# Patient Record
Sex: FEMALE | Race: WHITE | NOT HISPANIC OR LATINO | Employment: PART TIME | URBAN - METROPOLITAN AREA
[De-identification: names, ages, dates, MRNs, and addresses within clinical notes are randomized per-mention and may not be internally consistent; named-entity substitution may affect disease eponyms.]

---
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
## 2019-10-03 ENCOUNTER — HEALTH MAINTENANCE LETTER (OUTPATIENT)
Age: 19
End: 2019-10-03

## 2019-12-05 ENCOUNTER — HOSPITAL ENCOUNTER (EMERGENCY)
Facility: CLINIC | Age: 19
Discharge: PSYCHIATRIC OR SUBSTANCE USE FACILITY | End: 2019-12-05
Attending: EMERGENCY MEDICINE | Admitting: EMERGENCY MEDICINE
Payer: COMMERCIAL

## 2019-12-05 ENCOUNTER — HOSPITAL ENCOUNTER (INPATIENT)
Facility: CLINIC | Age: 19
LOS: 1 days | Discharge: HOME | DRG: 882 | End: 2019-12-06
Attending: PSYCHIATRY & NEUROLOGY | Admitting: PSYCHIATRY & NEUROLOGY
Payer: COMMERCIAL

## 2019-12-05 VITALS
HEART RATE: 73 BPM | DIASTOLIC BLOOD PRESSURE: 78 MMHG | TEMPERATURE: 98.5 F | RESPIRATION RATE: 16 BRPM | OXYGEN SATURATION: 99 % | SYSTOLIC BLOOD PRESSURE: 115 MMHG | WEIGHT: 136.91 LBS | BODY MASS INDEX: 20.36 KG/M2

## 2019-12-05 DIAGNOSIS — F32.A DEPRESSION, UNSPECIFIED DEPRESSION TYPE: ICD-10-CM

## 2019-12-05 DIAGNOSIS — T14.91XA SUICIDAL BEHAVIOR WITH ATTEMPTED SELF-INJURY (H): Primary | ICD-10-CM

## 2019-12-05 DIAGNOSIS — T50.904A DRUG OVERDOSE, UNDETERMINED INTENT, INITIAL ENCOUNTER: ICD-10-CM

## 2019-12-05 PROBLEM — R45.89 SUICIDAL BEHAVIOR: Status: ACTIVE | Noted: 2019-12-05

## 2019-12-05 LAB
ALBUMIN SERPL-MCNC: 3.8 G/DL (ref 3.4–5)
ALBUMIN UR-MCNC: NEGATIVE MG/DL
ALP SERPL-CCNC: 57 U/L (ref 40–150)
ALT SERPL W P-5'-P-CCNC: 20 U/L (ref 0–50)
AMPHETAMINES UR QL SCN: NEGATIVE
ANION GAP SERPL CALCULATED.3IONS-SCNC: 8 MMOL/L (ref 3–14)
APAP SERPL-MCNC: <2 MG/L (ref 10–20)
APAP SERPL-MCNC: <2 MG/L (ref 10–20)
APPEARANCE UR: CLEAR
AST SERPL W P-5'-P-CCNC: 20 U/L (ref 0–45)
B-HCG FREE SERPL-ACNC: <5 IU/L
BACTERIA #/AREA URNS HPF: ABNORMAL /HPF
BARBITURATES UR QL: NEGATIVE
BASOPHILS # BLD AUTO: 0 10E9/L (ref 0–0.2)
BASOPHILS NFR BLD AUTO: 0.5 %
BENZODIAZ UR QL: NEGATIVE
BILIRUB SERPL-MCNC: 0.9 MG/DL (ref 0.2–1.3)
BILIRUB UR QL STRIP: NEGATIVE
BUN SERPL-MCNC: 16 MG/DL (ref 7–30)
CALCIUM SERPL-MCNC: 9.1 MG/DL (ref 8.5–10.1)
CANNABINOIDS UR QL SCN: POSITIVE
CHLORIDE SERPL-SCNC: 108 MMOL/L (ref 94–109)
CO2 SERPL-SCNC: 23 MMOL/L (ref 20–32)
COCAINE UR QL: POSITIVE
COLOR UR AUTO: ABNORMAL
CREAT SERPL-MCNC: 0.9 MG/DL (ref 0.52–1.04)
DIFFERENTIAL METHOD BLD: NORMAL
EOSINOPHIL # BLD AUTO: 0.2 10E9/L (ref 0–0.7)
EOSINOPHIL NFR BLD AUTO: 4.7 %
ERYTHROCYTE [DISTWIDTH] IN BLOOD BY AUTOMATED COUNT: 12.5 % (ref 10–15)
ETHANOL SERPL-MCNC: <0.01 G/DL
GFR SERPL CREATININE-BSD FRML MDRD: >90 ML/MIN/{1.73_M2}
GLUCOSE SERPL-MCNC: 84 MG/DL (ref 70–99)
GLUCOSE UR STRIP-MCNC: NEGATIVE MG/DL
HCT VFR BLD AUTO: 41 % (ref 35–47)
HGB BLD-MCNC: 13.5 G/DL (ref 11.7–15.7)
HGB UR QL STRIP: ABNORMAL
IMM GRANULOCYTES # BLD: 0 10E9/L (ref 0–0.4)
IMM GRANULOCYTES NFR BLD: 0.2 %
INTERPRETATION ECG - MUSE: NORMAL
KETONES UR STRIP-MCNC: NEGATIVE MG/DL
LEUKOCYTE ESTERASE UR QL STRIP: NEGATIVE
LYMPHOCYTES # BLD AUTO: 1.2 10E9/L (ref 0.8–5.3)
LYMPHOCYTES NFR BLD AUTO: 27.5 %
MCH RBC QN AUTO: 30 PG (ref 26.5–33)
MCHC RBC AUTO-ENTMCNC: 32.9 G/DL (ref 31.5–36.5)
MCV RBC AUTO: 91 FL (ref 78–100)
MONOCYTES # BLD AUTO: 0.5 10E9/L (ref 0–1.3)
MONOCYTES NFR BLD AUTO: 10.1 %
MUCOUS THREADS #/AREA URNS LPF: PRESENT /LPF
NEUTROPHILS # BLD AUTO: 2.5 10E9/L (ref 1.6–8.3)
NEUTROPHILS NFR BLD AUTO: 57 %
NITRATE UR QL: NEGATIVE
NRBC # BLD AUTO: 0 10*3/UL
NRBC BLD AUTO-RTO: 0 /100
OPIATES UR QL SCN: NEGATIVE
PCP UR QL SCN: NEGATIVE
PH UR STRIP: 6.5 PH (ref 5–7)
PLATELET # BLD AUTO: 226 10E9/L (ref 150–450)
POTASSIUM SERPL-SCNC: 3.6 MMOL/L (ref 3.4–5.3)
PROT SERPL-MCNC: 7.4 G/DL (ref 6.8–8.8)
RBC # BLD AUTO: 4.5 10E12/L (ref 3.8–5.2)
RBC #/AREA URNS AUTO: <1 /HPF (ref 0–2)
SALICYLATES SERPL-MCNC: <2 MG/DL
SODIUM SERPL-SCNC: 139 MMOL/L (ref 133–144)
SOURCE: ABNORMAL
SP GR UR STRIP: 1 (ref 1–1.03)
SQUAMOUS #/AREA URNS AUTO: 2 /HPF (ref 0–1)
UROBILINOGEN UR STRIP-MCNC: NORMAL MG/DL (ref 0–2)
WBC # BLD AUTO: 4.4 10E9/L (ref 4–11)
WBC #/AREA URNS AUTO: 1 /HPF (ref 0–5)

## 2019-12-05 PROCEDURE — 25000132 ZZH RX MED GY IP 250 OP 250 PS 637: Performed by: NURSE PRACTITIONER

## 2019-12-05 PROCEDURE — 85025 COMPLETE CBC W/AUTO DIFF WBC: CPT | Performed by: EMERGENCY MEDICINE

## 2019-12-05 PROCEDURE — 90791 PSYCH DIAGNOSTIC EVALUATION: CPT

## 2019-12-05 PROCEDURE — 93005 ELECTROCARDIOGRAM TRACING: CPT

## 2019-12-05 PROCEDURE — 81001 URINALYSIS AUTO W/SCOPE: CPT | Mod: 91 | Performed by: EMERGENCY MEDICINE

## 2019-12-05 PROCEDURE — 99285 EMERGENCY DEPT VISIT HI MDM: CPT | Mod: 25

## 2019-12-05 PROCEDURE — 80307 DRUG TEST PRSMV CHEM ANLYZR: CPT | Performed by: EMERGENCY MEDICINE

## 2019-12-05 PROCEDURE — 80329 ANALGESICS NON-OPIOID 1 OR 2: CPT | Performed by: EMERGENCY MEDICINE

## 2019-12-05 PROCEDURE — 84702 CHORIONIC GONADOTROPIN TEST: CPT

## 2019-12-05 PROCEDURE — 96360 HYDRATION IV INFUSION INIT: CPT

## 2019-12-05 PROCEDURE — 25800030 ZZH RX IP 258 OP 636: Performed by: EMERGENCY MEDICINE

## 2019-12-05 PROCEDURE — 80320 DRUG SCREEN QUANTALCOHOLS: CPT | Performed by: EMERGENCY MEDICINE

## 2019-12-05 PROCEDURE — 80053 COMPREHEN METABOLIC PANEL: CPT | Performed by: EMERGENCY MEDICINE

## 2019-12-05 PROCEDURE — 12400001 ZZH R&B MH UMMC

## 2019-12-05 RX ORDER — TRAZODONE HYDROCHLORIDE 50 MG/1
50 TABLET, FILM COATED ORAL
Status: DISCONTINUED | OUTPATIENT
Start: 2019-12-05 | End: 2019-12-06 | Stop reason: HOSPADM

## 2019-12-05 RX ORDER — OLANZAPINE 10 MG/2ML
10 INJECTION, POWDER, FOR SOLUTION INTRAMUSCULAR
Status: DISCONTINUED | OUTPATIENT
Start: 2019-12-05 | End: 2019-12-06 | Stop reason: HOSPADM

## 2019-12-05 RX ORDER — OLANZAPINE 10 MG/1
10 TABLET ORAL
Status: DISCONTINUED | OUTPATIENT
Start: 2019-12-05 | End: 2019-12-06 | Stop reason: HOSPADM

## 2019-12-05 RX ORDER — HYDROXYZINE HYDROCHLORIDE 25 MG/1
25 TABLET, FILM COATED ORAL EVERY 4 HOURS PRN
Status: DISCONTINUED | OUTPATIENT
Start: 2019-12-05 | End: 2019-12-06 | Stop reason: HOSPADM

## 2019-12-05 RX ORDER — BISACODYL 10 MG
10 SUPPOSITORY, RECTAL RECTAL DAILY PRN
Status: DISCONTINUED | OUTPATIENT
Start: 2019-12-05 | End: 2019-12-06 | Stop reason: HOSPADM

## 2019-12-05 RX ORDER — ACETAMINOPHEN 325 MG/1
650 TABLET ORAL EVERY 4 HOURS PRN
Status: DISCONTINUED | OUTPATIENT
Start: 2019-12-05 | End: 2019-12-06 | Stop reason: HOSPADM

## 2019-12-05 RX ORDER — NORGESTIMATE AND ETHINYL ESTRADIOL 0.25-0.035
1 KIT ORAL DAILY
Status: DISCONTINUED | OUTPATIENT
Start: 2019-12-05 | End: 2019-12-06 | Stop reason: HOSPADM

## 2019-12-05 RX ORDER — ACYCLOVIR 400 MG/1
400 TABLET ORAL 3 TIMES DAILY
Status: ON HOLD | COMMUNITY
Start: 2019-12-03 | End: 2019-12-06

## 2019-12-05 RX ORDER — METRONIDAZOLE 500 MG/1
500 TABLET ORAL 2 TIMES DAILY
Status: ON HOLD | COMMUNITY
Start: 2019-12-03 | End: 2019-12-06

## 2019-12-05 RX ORDER — ALUMINA, MAGNESIA, AND SIMETHICONE 2400; 2400; 240 MG/30ML; MG/30ML; MG/30ML
30 SUSPENSION ORAL EVERY 4 HOURS PRN
Status: DISCONTINUED | OUTPATIENT
Start: 2019-12-05 | End: 2019-12-06 | Stop reason: HOSPADM

## 2019-12-05 RX ADMIN — SODIUM CHLORIDE 1000 ML: 9 INJECTION, SOLUTION INTRAVENOUS at 08:50

## 2019-12-05 RX ADMIN — NORGESTIMATE AND ETHINYL ESTRADIOL 1 TABLET: KIT at 21:54

## 2019-12-05 RX ADMIN — TRAZODONE HYDROCHLORIDE 50 MG: 50 TABLET ORAL at 21:55

## 2019-12-05 ASSESSMENT — ENCOUNTER SYMPTOMS
FATIGUE: 1
ABDOMINAL PAIN: 0
VOMITING: 0
NAUSEA: 0

## 2019-12-05 ASSESSMENT — ACTIVITIES OF DAILY LIVING (ADL)
ORAL_HYGIENE: INDEPENDENT
DRESS: INDEPENDENT
LAUNDRY: WITH SUPERVISION
HYGIENE/GROOMING: INDEPENDENT

## 2019-12-05 ASSESSMENT — MIFFLIN-ST. JEOR: SCORE: 1413.62

## 2019-12-05 NOTE — ED PROVIDER NOTES
"  History     Chief Complaint:  Ingestion    The history is provided by the patient.      Rama Reddy is a 19 year old female with a history of depression who presents for evaluation of an ingestion. This morning, around 0800, the patient reports taking 8 tablets of 500 mg Flagyl and 13 tablets of 400 mg acyclovir. After ingesting the medications, she drove herself to the ED without informing any of her family or friends. Here, she states she \"doesn't know\" why she took the medications. She currently denies feeling suicidal. She denies any other co-ingestions including ibuprofen or Tylenol, or any other self-inflicted injury. Other than fatigue, she denies any physical symptoms such as nausea, abdominal pain, chest pain, or vomiting. She denies being abused currently. She does note a family history of depression, but not of suicide or suicide attempts. Of note, she was prescribed this medication two days ago for a genital infection and took them as prescribed yesterday. She is on birth control and states her last period was three weeks ago, however she is sexually active.  Per chart review, the patient has a personal history of drug ingestion and overdose, most recently in November 2018.    Allergies:  No Known Allergies     Medications:    Ortho-cyclen     Past Medical History:    Depression    Past Surgical History:    The patient does not have any pertinent past surgical history.    Family History:    Sisters: RA, Thyroid cancer    Social History:  Marital Status:  Single [1]  Presents to the ED alone.   Negative for tobacco use.  Negative for alcohol use.  Negative for drug use.     Review of Systems   Constitutional: Positive for fatigue.   Cardiovascular: Negative for chest pain.   Gastrointestinal: Negative for abdominal pain, nausea and vomiting.   Psychiatric/Behavioral: Positive for self-injury (ingestion). Negative for suicidal ideas.   All other systems reviewed and are negative.      Physical Exam "     Patient Vitals for the past 24 hrs:   BP Temp Temp src Pulse Heart Rate Resp SpO2 Weight   12/05/19 1200 107/79 -- -- 72 73 -- 97 % --   12/05/19 1145 101/70 -- -- 66 62 -- 97 % --   12/05/19 1130 107/65 -- -- 72 70 -- 96 % --   12/05/19 1115 102/59 -- -- 67 64 -- 97 % --   12/05/19 1100 97/82 -- -- 72 71 -- 99 % --   12/05/19 1045 103/72 -- -- 71 70 -- 98 % --   12/05/19 1030 102/65 -- -- 74 73 -- 98 % --   12/05/19 1015 103/64 -- -- 64 62 -- 98 % --   12/05/19 1000 110/74 -- -- 71 84 -- 100 % --   12/05/19 0945 108/69 -- -- 76 79 -- 100 % --   12/05/19 0930 110/74 -- -- 71 76 -- 100 % --   12/05/19 0919 113/74 -- -- 79 85 -- 100 % --   12/05/19 0900 116/73 -- -- 75 75 23 100 % --   12/05/19 0845 120/67 -- -- 76 79 12 99 % --   12/05/19 0833 126/84 98  F (36.7  C) Oral -- 85 16 99 % --   12/05/19 0826 -- -- -- -- -- -- -- 62.1 kg (136 lb 14.5 oz)      Physical Exam  General: Adult female sitting upright  Eyes: PERRL, Conjunctive within normal limits. No scleral icterus.  ENT: Moist mucous membranes, oropharynx clear.   CV: Normal S1S2, no murmur, rub or gallop. Regular rate and rhythm  Resp: Clear to auscultation bilaterally, no wheezes, rales or rhonchi. Normal respiratory effort.  GI: Abdomen is soft, nontender and nondistended. No palpable masses. No rebound or guarding.  MSK: No edema. Nontender. Normal active range of motion.  Skin: Warm and dry. No rashes or lesions or ecchymoses on visible skin.  Neuro: Alert and oriented. Responds appropriately to all questions and commands. No focal findings appreciated. Normal muscle tone.  Psych: Teary. Depressed mood.    Emergency Department Course   ECG:  Indication: ingestion  Time: 0830  Vent. Rate 86 bpm. CT interval 98. QRS duration 90. QT/QTc 358/428. P-R-T axis -15 92 17.    Sinus rhythm with sinus arrhythmia with short CT.   Rightward axis.  Borderline ECG. Read time: 0830.    Laboratory:  CBC: WBC: 4.4, HGB: 13.5, PLT: 226  CMP: All WNL (Creatinine:  0.90)  0842 Acetaminophen level: <2  1216 Acetaminophen level: <2  Salicylate level: <2  Alcohol level: <0.01    UA with Microscopic: Blood: Small (A), Bacteria: Few (A), Squamous epithelial: 2 (H), Mucous: Present (A), o/w WNL  Drug abuse screen urine: Cocaine Positive (A), Cannabinoids Positive (A), o/w Negative    ISTAT HCG: <5.0    Procedures:  None    Interventions:  0850 NS 1L IV     Emergency Department Course:  Nursing notes and vitals reviewed.   0829: I performed an exam of the patient as documented above. I placed her on an LAUREN hold.      0836: I called poison control and discussed the patient's presentation. They recommended observation for 4-6 hours from the time of ingestion before medical clearance.     Patient placed on cardiac continuous monitoring.   EKG obtained in the ED, see results above.   IV was inserted and blood was drawn for laboratory testing, results above.   The patient provided a urine sample here in the emergency department. This was sent for laboratory testing, findings above.      1018: I rechecked the patient and discussed the results of her workup thus far. She denies any symptoms, but does state she does not want to be here.     1153: I consulted with gurvinder JackmanDEC , regarding the patient's history and presentation here in the emergency department prior to their evaluation of the patient.      Patient reassessed. She notes she doesn't want to be here but is cooperative. She denies any current symptoms.    1252: After their evaluation of the patient, the DEC  is recommending the patient be admitted to inpatient psychiatry which is a plan I agree with. They will call central intake to initiate the process of admission.     Patient reassessed. Denies any new concerns.     1500: Patient's care was signed out to oncoming ED provider at shift change pending transfer to [facility].     Findings and plan explained to the Patient. Patient will be transferred to  [facility] via EMS. Dr. Vargas will admit the patient to a monitored bed for further monitoring, evaluation, and treatment.    Impression & Plan      Medical Decision Making:  Rama Reddy is a 19 year old female with a history of depression and previous drug overdose who presents to the emergency department with concerns for overdose of metronidazole and acyclovir which were prescribed two days prior. The patient, here, was not forthright with information and was unable to answer whether or not she was suicidal. She was obviously depressed and teary. My concern is for intentional overdose which seems likely given her history and presentation. Fortunately, there does not seem to be any harm with the medication she overdosed on. Over an observation stay of 6 hours, she developed no GI symptoms. She denied Tylenol or salicylate ingestion, and there were no abnormalities that would suggest this on evaluation here. She was cannabinoid and cocaine positive on drug urine screen. She is not altered or intoxicated here in the ED. Due to concerns for suicidality, once medically cleared, a DEC consult was made and she was evaluated. Given difficulty with ascertaining safety and concern for intentional overdose, she will be admitted to inpatient psychiatric care. Dr. Vargas will admit the patient to [facility] at [place]. Care is signed out to my colleague Dr. Durant while still in the ED awaiting transfer.     Diagnosis:    ICD-10-CM    1. Drug overdose, undetermined intent, initial encounter T50.904A    2. Depression, unspecified depression type F32.9        Disposition:  Signed out to Dr. Durant pending transfer    Scribe Disclosure:  I, Rachna Navarro, am serving as a scribe on 12/5/2019 at 8:28 AM to personally document services performed by Marybeth Hernandez MD based on my observations and the provider's statements to me.      12/5/2019   [facility] EMERGENCY DEPARTMENT       Marybeth Hernandez  MD [place]  12/05/19 4337

## 2019-12-05 NOTE — ED NOTES
Pt's belongings placed in clear plastic bag and secured per protocol. Black shoes, plaid shirt, jacket, black bag with one red strap and grey hat are in clear plastic bag. Pt chose to retain her pants, ID card and insurance card, and cell phone.

## 2019-12-05 NOTE — ED TRIAGE NOTES
Pt arrives 30 minutes after ingesting approximately 20 pills, a mixture of acyclovir and metronidazole. Pt states she is unsure why she took that many this morning.

## 2019-12-05 NOTE — ED NOTES
Transport arrived, RN gave report. DEC with pt and mom present in room. Pt gave verbal consent to share information

## 2019-12-06 VITALS
HEART RATE: 82 BPM | SYSTOLIC BLOOD PRESSURE: 103 MMHG | TEMPERATURE: 97.8 F | OXYGEN SATURATION: 99 % | WEIGHT: 131.2 LBS | DIASTOLIC BLOOD PRESSURE: 72 MMHG | BODY MASS INDEX: 19.88 KG/M2 | HEIGHT: 68 IN | RESPIRATION RATE: 16 BRPM

## 2019-12-06 PROCEDURE — 99236 HOSP IP/OBS SAME DATE HI 85: CPT | Performed by: PSYCHIATRY & NEUROLOGY

## 2019-12-06 PROCEDURE — 25000132 ZZH RX MED GY IP 250 OP 250 PS 637: Performed by: PSYCHIATRY & NEUROLOGY

## 2019-12-06 RX ORDER — METRONIDAZOLE 500 MG/1
500 TABLET ORAL 2 TIMES DAILY
Qty: 14 TABLET | Refills: 0 | Status: SHIPPED | OUTPATIENT
Start: 2019-12-06 | End: 2019-12-19

## 2019-12-06 RX ORDER — ACYCLOVIR 400 MG/1
400 TABLET ORAL 3 TIMES DAILY
Qty: 30 TABLET | Refills: 0 | Status: SHIPPED | OUTPATIENT
Start: 2019-12-06 | End: 2020-01-28

## 2019-12-06 RX ORDER — HYDROXYZINE HYDROCHLORIDE 25 MG/1
25 TABLET, FILM COATED ORAL 2 TIMES DAILY PRN
Qty: 30 TABLET | Refills: 0 | Status: SHIPPED | OUTPATIENT
Start: 2019-12-06 | End: 2020-05-15

## 2019-12-06 RX ORDER — FLUOXETINE 10 MG/1
TABLET, FILM COATED ORAL
Qty: 30 TABLET | Refills: 0 | Status: SHIPPED | OUTPATIENT
Start: 2019-12-06 | End: 2019-12-19

## 2019-12-06 RX ADMIN — Medication 5 MG: at 16:01

## 2019-12-06 ASSESSMENT — ACTIVITIES OF DAILY LIVING (ADL)
LAUNDRY: UNABLE TO COMPLETE
DRESS: STREET CLOTHES;INDEPENDENT;SCRUBS (BEHAVIORAL HEALTH)
ORAL_HYGIENE: INDEPENDENT
HYGIENE/GROOMING: HANDWASHING;SHOWER;INDEPENDENT

## 2019-12-06 NOTE — PROGRESS NOTES
12/05/19 1913   Patient Belongings   Did you bring any home meds/supplements to the hospital?  Yes  (Given to Nurse on Unit)   Disposition of meds    (Sent to Security - Envelope #[id number])   Patient Belongings locker  (Locker #10)   Patient Belongings Put in Hospital Secure Location (Security or Locker, etc.) cash/credit card;cell phone/electronics;clothing;keys;jewelry;necklace;shoes;purse/wallet   Belongings Search Yes   Clothing Search Yes   Second Staff Lraissa Aburto ([facility] [address] [place])       Belongings Kept in Locker #10:  2 Bracelets, [place] Drivers License, Health Insurance cards, clothing (sports bra, jeans, underwear, socks, shirt, long sleeve flannel button-up, jacket, hat, shoes), keys on lanyard with fob, lotion, wireless ear buds, perfume, earrings, chap stick, vape, lighters, wallet, Social Security Card, miscellaneous papers/cards/receipts , loose change, photographs, cell phone.    Contraband Item Bag (Picked up by Security)   Knife    Given to Nurse / Envelope #[id number]:  Metronidazole 500mg tabs in bottle  Acyclovir 400mg tabs in bottle  [place] Norgestimate and ethinyl estradiol tabs USP 0.250mg/0.035mg tabs    Sent to Security / Envelope #[id number]:  $26.00 cash (1 $20 bill, 1 $5 bill, 1 $1 bill)  [employer] Visa Debit Card (#[id number])  [employer] Platinum Visa Card (#[id number])  [facility] Gift Card    A               Admission:  I am responsible for any personal items that are not sent to the safe or pharmacy.  [facility] is not responsible for loss, theft or damage of any property in my possession.    Signature:  _________________________________ Date: _______  Time: _____                                              Staff Signature:  ____________________________ Date: ________  Time: _____      2nd Staff person, if patient is unable/unwilling to sign:    Signature: ________________________________ Date: ________  Time: _____     Discharge:  [facility] has returned all of my personal belongings:    Signature:  _________________________________ Date: ________  Time: _____                                          Staff Signature:  ____________________________ Date: ________  Time: _____

## 2019-12-06 NOTE — PLAN OF CARE
"Patient did not stay in a full group today, arriving late and getting pulled out of group. She did participate some, presenting with a flat affect. She became tearful at one point. She appeared irritable, guarded and sabrina in the morning, a degree more relaxed during clinic, and with the faintest, barely visible of smiles in the afternoon and joked as she was called out of group \"well it was a fun 2 rounds.\"   "

## 2019-12-06 NOTE — PLAN OF CARE
BEHAVIORAL TEAM DISCUSSION    Participants:   Dr. Vargas, Derrick Almeida RN, Dania James [facility]    Progress:   Pt presented to the [facility] after ingesting 8 tablets of 500 mg Flagyl and 13 tablets of 400 mg acyclovir and then drove herself to the ER.  This is her second ingestion of this type.  Father gave her an ultimatim last night about her motivation for work and school.  Pt is guarded and has not been forthcoming with information.     Drug screen was positive for cannabis and cocaine.    Anticipated length of stay:    5 days    Continued Stay Criteria/Rationale:   Pt needs continued hospitalization for ongoing evaluation of her safety.      Medical/Physical:   Herpes      Precautions:   Behavioral Orders   Procedures     Code 1 - Restrict to Unit     Routine Programming     As clinically indicated     Status 15     Every 15 minutes.     Suicide precautions     Patients on Suicide Precautions should have a Combination Diet ordered that includes a Diet selection(s) AND a Behavioral Tray selection for Safe Tray - with utensils, or Safe Tray - NO utensils       Plan:   Multidisciplinary evaluation  Medication management  Support and encourage pt to share feelings  Assess for immediate discharge.    Rationale for change in precautions or plan:   Initial review

## 2019-12-06 NOTE — PLAN OF CARE
Pt to discharge this shift. Pt reports satisfaction with this plan.   Denies suicidal ideation, self-harm thoughts, and homicidal ideation. Denies AH/VH.  Discharge medications and AVS reviewed with pt who reports understanding. Plans to follow-up with outpt providers and appts scheduled on AVS, but is already asking about getting them rescheduled.  Belongings sent with pt. Medications filled and sent with patient.  Being transported by sister who picked patient up on the unit.

## 2019-12-06 NOTE — PROGRESS NOTES
Provider Note:    Patient was assessed. She denied SI/HI. Future oriented. Pt signed 12 hr intent to leave. Spoke with father who is in support of patient discharge today.    Plan:  Discharge patient this evening  Discussed R/B/A of Prozac and hydroxyzine prn, which were prescribed on discharge  Refer for day treatment programming. Patient agreed to participate in programming    Jessica Vargas MD  [facility]

## 2019-12-06 NOTE — PROGRESS NOTES
Behavioral Discharge Planning and Instructions      Summary:  You were admitted on 12/5/2019  due to an ingestion of 8 tablets of 500 mg Flagyl and 13 tablets of 400 mg acyclovir and then your drove yourself to the [facility] ER.  You were transferred to the [facility]. You were treated by Dr. Mila Vargas MD. You did not want to stay and signed a 12 hour notice of a request to leave.  Dr. Vargas talked with your father. He was in agreement with you coming home. You agreed to seek outpatient services.  You were assessed as not being a danger to yourself. You were discharged on 12/6/2019 from [facility] to Home.        Principal Diagnosis:   The ER physician gave you the following diagnoses.  1. Drug overdose, undetermined intent, initial encounter T50.904A   2. Depression, unspecified depression type F32.9            Health Care Follow-up Appointments:   [facility] have [facility] commercial [facility].    Attend your new patient  individual therapy appointment with HARINI Reddy on Wednesday, December 11, 2019 @ 1PM. Come at 12:30PM to fill out paperwork.  She is going to refer you to a psychiatric medication management provider in this agency.  [address] [address]  Ph: [phone]  The Health Unit Coordinator has faxed these discharge instructions to fax: [phone]        Attend your new patient [facility] day treatment intake evaluation  on Thursday, December 12, 2019 @ 1:30PM.  [address] [place] [id number] [phone]  The [facility] Unit Coordinator has faxed these discharge instructions to Fax#[phone]        Attend your primary care appointment with Bg Sam on Thursday Dec 19, 2019 3:40 PM.  It is important to receive regular medical care and to talk to your doctor about your hospitalization. You may also ask her if you can use her  for psychaitric medication refills if there are any complication obtaining a psychiatric medication management provider. She can also refer you to a Collaborative Care psychiatric provider if necessary.  Dec19 SHORT  with Bg Sam PA-C   Thursday Dec 19, 2019 3:40 PM  [address], [address]  [facility] [address] [phone]          Attend all scheduled appointments with your outpatient providers. Call at least 24 hours in advance if you need to reschedule an appointment to ensure continued access to your outpatient providers.   Major Treatments, Procedures and Findings:  You were provided with: a psychiatric assessment, medication evaluation and/or management and group therapy    Your pregnancy test is negative.    Symptoms to Report: mood getting worse or thoughts of suicide    Early warning signs can include: increased thoughts or behaviors of suicide or self-harm     Safety and Wellness:  Take all medicines as directed.  Make no changes unless your doctor suggests them.      Follow treatment recommendations.  Refrain from alcohol and non-prescribed drugs.  If there is a concern for safety, call 261.    Resources:   [place] on Mental Illness (www.[place].org): 107.146.1146 or 037-672-4197.      [facility] MENTAL HEALTH CRISIS PROGRAM - [phone]  Mental Health Crisis Program provides crisis intervention to residents of [facility]. This service is available 24 hours a day, 7 days a week. The Crisis Program provides telephone crisis intervention and mobile on-site response to assess and stabilize immediate crisis.      In the [facility] area, call **CRISIS (741711) from a cell phone to talk to a team of professionals who can help you.    Crisis Text Line: Text [place] to 728800        The treatment team has appreciated the opportunity to work with you.     If you have any questions or concerns our unit number is [phone]

## 2019-12-06 NOTE — DISCHARGE INSTRUCTIONS
Behavioral Discharge Planning and Instructions      Summary:  You were admitted on 12/5/2019  due to an ingestion of 8 tablets of 500 mg Flagyl and 13 tablets of 400 mg acyclovir and then your drove yourself to the [facility].  You were transferred to the [facility]. You were treated by Dr. Mila Vargas MD. You did not want to stay and signed a 12 hour notice of a request to leave.  Dr. Vargas talked with your father. He was in agreement with you coming home. You agreed to seek outpatient services.  You were assessed as not being a danger to yourself. You were discharged on 12/6/2019 from [facility] to Home.        Principal Diagnosis:     Social Anxiety Disorder  Depressive Disorder, unspecified        Health Care Follow-up Appointments:   You have [employer] commercial insurance.    Attend your new patient  individual therapy appointment with HARINI Reddy on Wednesday, December 11, 2019 @ 1PM. Come at 12:30PM to fill out paperwork.  She is going to refer you to a psychiatric medication management provider in this agency.  [facility]  [address] [place] [address]  Ph: [phone]  The Health Unit Coordinator has faxed these discharge instructions to fax: [phone]        [facility] day [facility] intake evaluation  on Thursday, December 12, 2019 @ 1:30PM.  [place] [address] [place] [address]  [phone]  The [facility] Unit Coordinator has faxed these discharge instructions to Fax#[phone]        Attend your primary care appointment with Bg Sam on Thursday Dec 19, 2019 3:40 PM.  It is important to receive regular medical care and to talk to your doctor about your hospitalization. You may also ask her if you can use her for psychaitric medication refills if there are any complication obtaining a psychiatric medication management  provider. She can also refer you to a Collaborative Care psychiatric provider if necessary.  Dec19 SHORT  with Bg Sam PA-C   Thursday Dec 19, 2019 3:40 PM  [address], [address]  [facility] [address] [phone]          Attend all scheduled appointments with your outpatient providers. Call at least 24 hours in advance if you need to reschedule an appointment to ensure continued access to your outpatient providers.   Major Treatments, Procedures and Findings:  You were provided with: a psychiatric assessment, medication evaluation and/or management and group therapy    Your pregnancy test is negative.    Symptoms to Report: mood getting worse or thoughts of suicide    Early warning signs can include: increased thoughts or behaviors of suicide or self-harm     Safety and Wellness:  Take all medicines as directed.  Make no changes unless your doctor suggests them.      Follow treatment recommendations.  Refrain from alcohol and non-prescribed drugs.  If there is a concern for safety, call 911.    Resources:   National [place] on Mental Illness (www.[place].leticia.org): 824.632.1497 or 427-765-4436.      [facility] MENTAL HEALTH CRISIS PROGRAM - [phone]  Mental Health Crisis Program provides crisis intervention to residents of [facility]. This service is available 24 hours a day, 7 days a week. The Crisis Program provides telephone crisis intervention and mobile on-site response to assess and stabilize immediate crisis.      In the [facility], call **CRISIS (494420) from a cell phone to talk to a team of professionals who can help you.    Crisis Text Line: Text [place] to 834679        The treatment team has appreciated the opportunity to work with you.     If you have any questions or concerns our unit number is [phone]

## 2019-12-06 NOTE — PLAN OF CARE
Pt attended some groups in the afternoon.  Tearful and distressed in1:1.  Pt reports feeing very uncomfortable on unit and wanting to discharge home. Pt signed 12 hour intent to discharge @ 1330 today. Dr Vargas informed.

## 2019-12-06 NOTE — PROGRESS NOTES
"Initial Psychosocial Assessment    I have reviewed the chart, met with the patient,     Pt signed CARINE for father:  Lew Reddy @ [phone]      Presenting Problem:  Pt presented to the [facility] after ingesting 8 tablets of 500 mg Flagyl and 13 tablets of 400 mg acyclovir and then drove herself to the ER.  This is her second ingestion of this type.  Father gave her an ultimatim last night     Pt was positive for cannabis and cocaine.      History of Mental Health and Chemical Dependency:    Father reports that the pt has been suffering from severe anxiety and parents have been trying to get her help.    Pt had a first ingestion in November 2018 with unknown quantity of Nyquil, pain pills, muscle relaxers, and ibuprofen. The precipitant was a break up with boyfriend.    Pt has tried some therapy in the past.    Family Description (Constellation, Family Psychiatric History):    Pt was born in [place] and raised in [place].  Pt has 3 children from a previous marriage and 3 children from pt;s mother.  Pt's parents  when she was 4  She is the youngest of all the children.  She lived with mother until she was 13/15 and then moved in with Dad as she gets along better with him.        Significant Life Events (Illness, Abuse, Trauma, Death):  Pt describes her childhood as \"good.\"    Living Situation:  Pt lives with father, Sister, brother - in- law and niece and nephew in [facility].    Educational Background:  Pt dropped out of high school in 11th grade and got her GED 2 month before she would have graduated.    Occupational History:  Pt works at [employer] in the [employer]. She works part time and father doesn't think she works enough. She has jessica trying to get a better job.    Financial Status:  Pt has a small income from employment and has insurance through her mother.    Legal Issues:  Pt does not have legal issues.    Ethnic/Cultural Issues:  Pt identifies as  and " "female.    Spiritual Orientation:  None     Service History:  None    Social Functioning (organization, interests):  Skateboarding,  Doing things outside, waling, biking, trying new food places, spending time with friends    Current Treatment Providers are:    PCP - [facility] Service Assessment/Plan:    Pt is quite tearful at the thought of staying her as she has requested immediate discharge. She told me her father would prefer her to be home and this proved to be true.  She reports that her drug use is not a problem for her and she does not want her father to know about her positive drug screen.  She does feel that she needs treatment for anxiety and agrees to therapy and medication management. She only agrees to day program \"if I have to.\"  MD felt it imperative for the pt to go to day treatment for a safe discharge.  These services were arranged.  Pt called father to come get her at 5:30PM. Family had moved her car back home from [facility].  "

## 2019-12-06 NOTE — H&P
"Combined H&P/Psychiatric Discharge Summary    Rama Reddy MRN# [id number]   Age: 19 year old YOB: 2000     Date of Admission:  12/5/2019  Date of Discharge:  12/6/2019  5:21 PM  Admitting Physician:  Mila Vargas MD  Discharge Physician:  Mila Vargas MD (Contact: [phone])         Event Leading to Hospitalization:   Per ED note:    Rama Reddy is a 19 year old female with a history of depression who presents for evaluation of an ingestion. This morning, around 0800, the patient reports taking 8 tablets of 500 mg Flagyl and 13 tablets of 400 mg acyclovir. After ingesting the medications, she drove herself to the ED without informing any of her family or friends. Here, she states she \"doesn't know\" why she took the medications. She currently denies feeling suicidal. She denies any other co-ingestions including ibuprofen or Tylenol, or any other self-inflicted injury. Other than fatigue, she denies any physical symptoms such as nausea, abdominal pain, chest pain, or vomiting. She denies being abused currently. She does note a family history of depression, but not of suicide or suicide attempts. Of note, she was prescribed this medication two days ago for a genital infection and took them as prescribed yesterday. She is on birth control and states her last period was three weeks ago, however she is sexually active.  Per chart review, the patient has a personal history of drug ingestion and overdose, most recently in November 2018.       See Admission note by Marybeth Hernandez MD on 12/5/19 for additional details.          Diagnoses:     Adjustment Disorder with mixed anxiety and depressed mood  Social Anxiety Disorder  Cannabis Use Disorder, moderate  R/O Stimulant Use Disorder, cocaine type         Psychiatric Review of Systems:   Depression:   Reports: impaired concentration, decreased energy, irritability.   Denies: depressed mood, suicidal ideation, decreased interest, changes " "in sleep, changes in appetite, guilt, hopelessness, helplessness.  Breonna:   Denies: sleeplessness, increased goal-directed activities, abrupt increase in energy pressured speech  Psychosis:   Denies: visual hallucinations, auditory hallucinations, paranoia  Anxiety:   Reports: excessive worries that are difficult to control. She also reports difficulty specifically in social settings, such as interviews, public speaking.   Denies: panic attacks for the past two months  PTSD:   Denies: re-experiencing past trauma, nightmares, increased arousal, avoidance of traumatic stimuli, impaired function.  OCD:   Denies: obsessions, checking, symmetry, cleaning, skin picking.  ED:   Denies: restriction, binging, purging.         Medical Review of Systems:     Review of systems positive for NONE  10 point review of systems is otherwise negative unless noted above.            Psychiatric History:   Psychiatric Hospitalizations: None  History of Psychosis: None  Prior ECT: None  Court Commitment: None  Suicide Attempts: None per patient  Self-injurious Behavior: None  Violence toward others: None  Use of Psychotropics: Zoloft (notes that she was taking it for 2-3 months, \"but I didn't like how it made me feel; it made it harder for me to control my emotions\")         Substance Use History:   Alcohol: Rarely  Cannabis: smoking a few times per week  Nicotine: Uses \"a vape\" daily  Cocaine: \"here and there, a couple times per month for the past two months\" via snorting.   Methamphetamine: none  Opiates/Heroin: none  Benzodiazepines: none  Hallucinogens: none  Inhalants: none    Prior Chemical Dependency treatment: None         Social History:   Upbringing: Born and raised in [place] by both parents. She has 5 siblings, and she is the youngest. Parents  15 years ago.   Educational History: Dropped out of high school, but then got her GED. No college.  Relationships: Single. She went through breakup 1.5 months ago after a 3 year long " relationship.  Children: None  Current Living Situation: Living with her Dad, oldest sister, brother in law, nephew and niece.  Occupational History: Working at [employer]. She enjoys it but also wants to explore other options.  Financial Support: Self and Dad  Legal History: None  Abuse/Trauma History: None         Family History:   Sisters with depression and anxiety  H/o completed suicides in family: None         Past Medical History:   No past medical history on file.    History of seizures: None  History of Head Trauma and/or loss of consciousness: None         Past Surgical History:   No past surgical history on file.           Allergies:    No Known Allergies           Medications:   I have reviewed this patient's current medications  Medications Prior to Admission   Medication Sig Dispense Refill Last Dose     acyclovir (ZOVIRAX) 400 MG tablet Take 400 mg by mouth 3 times daily Take 1 tablet by Mouth Three times a day for 10 days for the first episode.   12/5/2019 at 07     metroNIDAZOLE (FLAGYL) 500 MG tablet Take 500 mg by mouth 2 times daily Take 1 tab by Mouth twice daily for 7 days   12/5/2019 at 07     norgestimate-ethinyl estradiol (ORTHO-CYCLEN, SPRINTEC) 0.25-35 MG-MCG per tablet Take 1 tablet by mouth daily   12/4/2019 at 2000              Labs:     Recent Results (from the past 168 hour(s))   EKG 12 lead    Collection Time: 12/05/19  8:30 AM   Result Value Ref Range    Interpretation ECG Click View Image link to view waveform and result    CBC with platelets differential    Collection Time: 12/05/19  8:42 AM   Result Value Ref Range    WBC 4.4 4.0 - 11.0 10e9/L    RBC Count 4.50 3.8 - 5.2 10e12/L    Hemoglobin 13.5 11.7 - 15.7 g/dL    Hematocrit 41.0 35.0 - 47.0 %    MCV 91 78 - 100 fl    MCH 30.0 26.5 - 33.0 pg    MCHC 32.9 31.5 - 36.5 g/dL    RDW 12.5 10.0 - 15.0 %    Platelet Count 226 150 - 450 10e9/L    Diff Method Automated Method     % Neutrophils 57.0 %    % Lymphocytes 27.5 %    % Monocytes 10.1  %    % Eosinophils 4.7 %    % Basophils 0.5 %    % Immature Granulocytes 0.2 %    Nucleated RBCs 0 0 /100    Absolute Neutrophil 2.5 1.6 - 8.3 10e9/L    Absolute Lymphocytes 1.2 0.8 - 5.3 10e9/L    Absolute Monocytes 0.5 0.0 - 1.3 10e9/L    Absolute Eosinophils 0.2 0.0 - 0.7 10e9/L    Absolute Basophils 0.0 0.0 - 0.2 10e9/L    Abs Immature Granulocytes 0.0 0 - 0.4 10e9/L    Absolute Nucleated RBC 0.0    Comprehensive metabolic panel    Collection Time: 12/05/19  8:42 AM   Result Value Ref Range    Sodium 139 133 - 144 mmol/L    Potassium 3.6 3.4 - 5.3 mmol/L    Chloride 108 94 - 109 mmol/L    Carbon Dioxide 23 20 - 32 mmol/L    Anion Gap 8 3 - 14 mmol/L    Glucose 84 70 - 99 mg/dL    Urea Nitrogen 16 7 - 30 mg/dL    Creatinine 0.90 0.52 - 1.04 mg/dL    GFR Estimate >90 >60 mL/min/[1.73_m2]    GFR Estimate If Black >90 >60 mL/min/[1.73_m2]    Calcium 9.1 8.5 - 10.1 mg/dL    Bilirubin Total 0.9 0.2 - 1.3 mg/dL    Albumin 3.8 3.4 - 5.0 g/dL    Protein Total 7.4 6.8 - 8.8 g/dL    Alkaline Phosphatase 57 40 - 150 U/L    ALT 20 0 - 50 U/L    AST 20 0 - 45 U/L   Acetaminophen level    Collection Time: 12/05/19  8:42 AM   Result Value Ref Range    Acetaminophen Level <2 mg/L   Salicylate level    Collection Time: 12/05/19  8:42 AM   Result Value Ref Range    Salicylate Level <2 mg/dL   Alcohol level blood    Collection Time: 12/05/19  8:42 AM   Result Value Ref Range    Ethanol g/dL <0.01 <0.01 g/dL   ISTAT HCG Quantitative Pregnancy POCT    Collection Time: 12/05/19  8:48 AM   Result Value Ref Range    HCG Quantitative Serum <5.0 <5.0 IU/L   Drug abuse screen 77 urine    Collection Time: 12/05/19  9:08 AM   Result Value Ref Range    Amphetamine Qual Urine Negative NEG^Negative    Barbiturates Qual Urine Negative NEG^Negative    Benzodiazepine Qual Urine Negative NEG^Negative    Cannabinoids Qual Urine Positive (A) NEG^Negative    Cocaine Qual Urine Positive (A) NEG^Negative    Opiates Qualitative Urine Negative  NEG^Negative    PCP Qual Urine Negative NEG^Negative   UA with Microscopic    Collection Time: 12/05/19  9:08 AM   Result Value Ref Range    Color Urine Straw     Appearance Urine Clear     Glucose Urine Negative NEG^Negative mg/dL    Bilirubin Urine Negative NEG^Negative    Ketones Urine Negative NEG^Negative mg/dL    Specific Gravity Urine 1.005 1.003 - 1.035    Blood Urine Small (A) NEG^Negative    pH Urine 6.5 5.0 - 7.0 pH    Protein Albumin Urine Negative NEG^Negative mg/dL    Urobilinogen mg/dL Normal 0.0 - 2.0 mg/dL    Nitrite Urine Negative NEG^Negative    Leukocyte Esterase Urine Negative NEG^Negative    Source Midstream Urine     WBC Urine 1 0 - 5 /HPF    RBC Urine <1 0 - 2 /HPF    Bacteria Urine Few (A) NEG^Negative /HPF    Squamous Epithelial /HPF Urine 2 (H) 0 - 1 /HPF    Mucous Urine Present (A) NEG^Negative /LPF   Acetaminophen level    Collection Time: 12/05/19 12:16 PM   Result Value Ref Range    Acetaminophen Level <2 mg/L          Physical Exam:   Please refer to physical exam completed by ED provider, Marybeth Hernandez MD, on 12/5/19. I agree with the findings and assessment and have no additional findings to add at this time.          Consults:   No consultations were requested during this admission         Hospital Course:   Rama Reddy was admitted to [facility] with attending Mila Vargas MD as a voluntary patient. The patient was placed under status 15 (15 minute checks) to ensure patient safety.     Rama Reddy is a 19 year old female with a history of depression and anxiety who presented to ED on 12/5 for evaluation of an ingestion. That morning, around 8 AM, the patient reported taking 8 tablets of 500 mg Flagyl and 13 tablets of 400 mg acyclovir. She did not state that this was a suicide attempt, though could not provide an alternative explanation while in ED. She was guarded, and minimized severity of ingestion. Collateral information from patient's father revealed that patient  "was given an \"ultimatum\" that she must help out more around the house and/or get a job/go back to school, or she would have to move out of their home in the next 30 days. Patient reportedly has suicide attempt via ingestion in 2018, though patient also denied that this was a suicide attempt.     In ED, patient was medically cleared. Utox was positive for cocaine and cannabinoids. Given difficulty with ascertaining safety and concern for intentional overdose, she was admitted to inpatient psychiatric care.     Upon interview with patient on day of discharge, she said \"I just took a few too many pills then realized that was not what I wanted to do so I brought myself to the hospital to make sure everything was okay.\" She said that she was on her way to work, and was really late because she overslept. She said that she was \"really upset\" with herself and then felt \"overwhelmed and upset.\" She said that she did not feel ready to go into work \"so it was kind of impulsive but right after I knew that wasn't right. I don't want to die. It was completely dumb.\" She said that she \"has all sorts of things\" that keep her alive, \"like my family, friends, going to work, have fun in my free time. I like feeling like I am productive.\" She adamantly denied SI. She was future oriented.     With regards to stressors, she noted that work has been stressful and the concept of finding a new job is stressful because she gets too nervous for interviews. She said that her family tells her she needs to contribute more, \"and I feel like I need to do more to help, but its hard to get myself to do it.\" She feels pressured though also feels \"overwhelmed\" quite easily. She feels she would benefit from healthier coping strategies. She admits to cocaine and marijuana use. She was offered formal CD treatment, though declined. She said \"I don't want to touch any of that again. I think it is partly why my mood and anxiety have been worse.\"     Patient " "was offered ongoing voluntary hospitalization, though expressed strong desire for discharge due to resolution of SI and due to feeling uncomfortable on general adult unit (with several patients quite a bit older than patient). Discussed R/B/A of Prozac and prn hydroxyzine. Patient was amenable to a trial of both medications. She also was amenable with plan to participate in a day treatment program.     I obtained collateral information from patient's father. He said that he spoke with her several times since she arrived on the unit, and he feels \"being there any longer will actually make things worse given how anxious she is there.\" He said \"What she really needs more than anything is some support outside of the hospital, like a therapist.\" He said that he has no safety concerns at this time, and is confident that patient will come to him seeking help if imminent concerns arise. He is comfortable with plan for her to return home given absence of SI.     Rama Reddy did participate in groups and was visible in the milieu.     The patient's symptoms of depression improved. SI resolved.     Rama Reddy was released to home. At the time of discharge Rama Reddy was determined to not be a danger to herself or others.     Today Rama Reddy denies SI, SIB, and HI. Patient also agreed to call 911/present to ED if any imminent safety concerns arise, including re-emergence of SI. Patient was provided with crisis resources at the time of discharge. Patient agreed to further reduce risk of self-harm by completely abstaining from illicit substances and alcohol, and agreed to remain medication adherent. Expressed understanding of the risks associated with alcohol use, illicit drug use, and medication non-adherence.           Discharge Medications:     Discharge Medication List as of 12/6/2019  4:53 PM      START taking these medications    Details   FLUoxetine (PROZAC) 10 MG tablet Take 1/2 tab (5 mg) daily for " one week. If tolerating, increase to 1 tab (10 mg) daily., Disp-30 tablet, R-0, E-Prescribe      hydrOXYzine (ATARAX) 25 MG tablet Take 1 tablet (25 mg) by mouth 2 times daily as needed for anxiety, Disp-30 tablet, R-0, E-Prescribe         CONTINUE these medications which have CHANGED    Details   acyclovir (ZOVIRAX) 400 MG tablet Take 1 tablet (400 mg) by mouth 3 times daily Take 1 tablet by Mouth Three times a day for 10 days for the first episode., Disp-30 tablet, R-0, E-Prescribe      metroNIDAZOLE (FLAGYL) 500 MG tablet Take 1 tablet (500 mg) by mouth 2 times daily Take 1 tab by Mouth twice daily for 7 days, Disp-14 tablet, R-0, E-Prescribe         CONTINUE these medications which have NOT CHANGED    Details   norgestimate-ethinyl estradiol (ORTHO-CYCLEN, SPRINTEC) 0.25-35 MG-MCG per tablet Take 1 tablet by mouth daily, Historical                Psychiatric Examination:   Appearance:  awake, alert and adequately groomed  Attitude:  cooperative  Eye Contact:  good  Mood:  anxious and better, restricted initially though later tearful and remorseful  Affect:  appropriate and in normal range  Speech:  clear, coherent  Psychomotor Behavior:  no evidence of tardive dyskinesia, dystonia, or tics  Thought Process:  logical, linear and goal oriented  Associations:  no loose associations  Thought Content:  no evidence of suicidal ideation or homicidal ideation and no evidence of psychotic thought  Insight:  good  Judgment:  intact  Oriented to:  time, person, and place  Attention Span and Concentration:  intact  Recent and Remote Memory:  intact  Language: Able to name objects, Able to repeat phrases and Able to read and write  Fund of Knowledge: appropriate  Muscle Strength and Tone: normal  Gait and Station: Normal         Discharge Plan:   Summary:  You were admitted on 12/5/2019  due to an ingestion of 8 tablets of 500 mg Flagyl and 13 tablets of 400 mg acyclovir and then your drove yourself to the [facility]  ER.  You were transferred to the [facility]. You were treated by Dr. Mila Vargas MD. You did not want to stay and signed a 12 hour notice of a request to leave.  Dr. Vargas talked with your father. He was in agreement with you coming home. You agreed to seek outpatient services.  You were assessed as not being a danger to yourself. You were discharged on 12/6/2019 from [facility] to Home.      Principal Diagnosis:      Social Anxiety Disorder  Depressive Disorder, unspecified           Health Care Follow-up Appointments:   You have [employer] commercial insurance.     Attend your new patient  individual therapy appointment with HARINI Reddy on Wednesday, December 11, 2019 @ 1PM. Come at 12:30PM to fill out paperwork.  She is going to refer you to a psychiatric medication management provider in this agency.  [address] [address]  Ph: [phone]  The Health Unit Coordinator has faxed these discharge instructions to fax: [phone]           Attend your new patient [facility] day treatment intake evaluation  on Thursday, December 12, 2019 @ 1:30PM.  [address] [place], [place] [id number] [phone]  The [facility] Unit Coordinator has faxed these discharge instructions to Fax#[phone]           Attend your primary care appointment with Bg Sam on Thursday Dec 19, 2019 3:40 PM.  It is important to receive regular medical care and to talk to your doctor about your hospitalization. You may also ask her if you can use her for psychaitric medication refills if there are any complication obtaining a psychiatric medication management provider. She can also refer you to a Collaborative Care psychiatric provider if necessary.  Dec19 SHORT  with Bg Sam PA-C   Thursday Dec 19, 2019 3:40 PM  [address]  [facility] [id number] [phone]             Attend all scheduled appointments with your outpatient providers. Call at least 24 hours in advance if you need to reschedule an appointment to ensure continued access to your outpatient providers.   Major Treatments, Procedures and Findings:  You were provided with: a psychiatric assessment, medication evaluation and/or management and group therapy     Your pregnancy test is negative.     Symptoms to Report: mood getting worse or thoughts of suicide     Early warning signs can include: increased thoughts or behaviors of suicide or self-harm      Safety and Wellness:  Take all medicines as directed.  Make no changes unless your doctor suggests them.      Follow treatment recommendations.  Refrain from alcohol and non-prescribed drugs.  If there is a concern for safety, call 911.     Resources:   [employer] [place] [employer] (www.[place].leticia.org): 353.765.7701 or 733-684-7402.        [facility] MENTAL HEALTH CRISIS PROGRAM - [phone]  Mental Health Crisis Program provides crisis intervention to residents of [facility]. This service is available 24 hours a day, 7 days a week. The Crisis Program provides telephone crisis intervention and mobile on-site response to assess and stabilize immediate crisis.       In the [facility], call **CRISIS ([phone]) from a cell phone to talk to a team of professionals who can help you.    Crisis Text Line: Text [place] to 406909             The treatment team has appreciated the opportunity to work with you.     If you have any questions or concerns our unit number is [phone]      Attestation:  The patient has been seen and evaluated by me,  Mila Vargas MD

## 2019-12-06 NOTE — PLAN OF CARE
Pt presents to [facility] with symptoms of Over dosing earlier this morning on her Flagyl and acyclovir. Rama is on Health Officer Hold until seen by a psychiatrist in the AM. Rama denies that this was a suicide attempt and feels this was a response to feeling overwhelmed after her Father informed her she needed to go to school or help around the house or move in thirty days.Rama does not feel she needs to be here and would like to be discharged in the AM. She has been using cocaine and marijuana and doesn't want her parents to know. Patient has history of Depression and anxiety. Overdosed a year ago that patient denies was a suicide attempt. Currently no community Mental Health providers. Rama was cooperative with admitting procedures and search.     Medical: Herpes Outbreak, first Episode    Plan:  Provide for safety, Monitor and assess Mood and behavior, encourage medication compliance, Develop Trust.

## 2019-12-09 ENCOUNTER — TELEPHONE (OUTPATIENT)
Dept: FAMILY MEDICINE | Facility: CLINIC | Age: 19
End: 2019-12-09

## 2019-12-09 ASSESSMENT — ANXIETY QUESTIONNAIRES
IF YOU CHECKED OFF ANY PROBLEMS ON THIS QUESTIONNAIRE, HOW DIFFICULT HAVE THESE PROBLEMS MADE IT FOR YOU TO DO YOUR WORK, TAKE CARE OF THINGS AT HOME, OR GET ALONG WITH OTHER PEOPLE: SOMEWHAT DIFFICULT
GAD7 TOTAL SCORE: 7
3. WORRYING TOO MUCH ABOUT DIFFERENT THINGS: SEVERAL DAYS
7. FEELING AFRAID AS IF SOMETHING AWFUL MIGHT HAPPEN: NOT AT ALL
6. BECOMING EASILY ANNOYED OR IRRITABLE: NEARLY EVERY DAY
5. BEING SO RESTLESS THAT IT IS HARD TO SIT STILL: SEVERAL DAYS
1. FEELING NERVOUS, ANXIOUS, OR ON EDGE: SEVERAL DAYS
2. NOT BEING ABLE TO STOP OR CONTROL WORRYING: NOT AT ALL

## 2019-12-09 ASSESSMENT — PATIENT HEALTH QUESTIONNAIRE - PHQ9
SUM OF ALL RESPONSES TO PHQ QUESTIONS 1-9: 4
5. POOR APPETITE OR OVEREATING: SEVERAL DAYS

## 2019-12-09 NOTE — TELEPHONE ENCOUNTER
[facility] With Attempted Self-Injury (H)    ER follow up scheduled for 12/19.    Jyothi Elliott/[facility]     ONCOLOGY/HEMATOLOGY OUTPATIENT PROGRESS NOTE    Patient: Casey Enriquez Sr.   MRN: [id number]  YOB: 1952  Date of Visit: 5/30/2017     REASON FOR CONSULTATION: Metastatic prostate cancer.    HISTORY OF ILLNESS: Mr. Casey Enriquez Sr. is a 65 year old- year old male with  metastatic prostate cancer. Metastatic carcinoma of the prostate presenting with a spinal cord compression status post surgical decompression of the mid thoracic spine 10/20/2016. He presented with spinal cord copmresion . Radiation to T2-T8    Casey his living at home with constant help. He is ambulatory using a wheelchair for long distances and a walker for short distances. He has good urinary and bowel function and pretty good strength. Appetite is good. He does have numbness of the entire left leg since the surgery on 10/20/2016 and complains that is has been worsening. He has been working with PT but notes increased weakness and numbness in the left leg. He is also complaining of lower back pain which again has been wprse He has an appointment with  on 4/4/1. He is also complaining of low back pain which is relatively stable.    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)   • S/P laminectomy with spinal fusion   • Abnormal gait   • 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)   • Weight loss, unintentional   • Chemotherapy induced neutropenia (CMS/HCC)   •  Pulmonary embolism (CMS/HCC)      Past Surgical History:   Procedure Laterality Date   • HERNIA REPAIR      Periumbilical in childhood   • INGUINAL HERNIA REPAIR Left 07/11/2016    MESH REPAIR   • LIPOMA RESECTION  2006    Right upper back   • LUMBAR LAMINECTOMY  10/20/2016    THORACIC 3-7 LAMINECTOMY     Social History   Substance Use Topics   • Smoking status: Former Smoker     Types: Cigarettes     Quit date: 11/1/2016   • Smokeless tobacco: Never Used   • Alcohol use No      Comment: \"Sometimes\" denies daily use      FAMILY HISTORY:  family history includes Diabetes in his brother; Mult Sclerosis in his brother, mother, and sister; Prostate Cancer in his father.   Current Outpatient Prescriptions   Medication Sig   • warfarin (COUMADIN) 2 MG tablet Take 1-2 tablets by mouth daily or as directed by [facility]   • bicalutamide (CASODEX) 50 MG tablet Take 1 tablet by mouth daily.   • HYDROcodone-acetaminophen (NORCO) 5-325 MG per tablet Take 1 tablet by mouth every 6 hours as needed for Pain.   • morphine SR (MS CONTIN) 15 MG 12 hr tablet Take 1 tablet by mouth 2 times daily.   • folic acid (FOLATE) 1 MG tablet Take 1 tablet by mouth daily.   • calcium carbonate-vitamin D (CALTRATE+D) 600-400 MG-UNIT per tablet Take 1 tablet by mouth daily.   • dextromethorphan-guaiFENesin (ROBITUSSIN DM)  MG/5ML syrup Take 5 mLs by mouth every 4 hours as needed (Cough).   • budesonide/formoterol (SYMBICORT) 80-4.5 MCG/ACT inhaler Inhale 2 puffs into the lungs 2 times daily.   • nortriptyline (PAMELOR) 25 MG capsule Take 1 capsule by mouth nightly.   • tamsulosin (FLOMAX) 0.4 MG Cap Take 1 capsule by mouth nightly.   • polyethylene glycol (MIRALAX) powder Take 17 g by mouth daily.   • ammonium lactate (LAC-HYDRIN) 12 % lotion Apply topically as needed for Dry Skin.   • oxyCODONE SR (OXYCONTIN) 10 MG 12 hr tablet Take 1 tablet by mouth every 12 hours.   • dexamethasone (DECADRON) 4 MG tablet Take 2 tablets by  mouth 2 times daily. Take for 3 days starting day prior to each docetaxel treatment.   • prochlorperazine (COMPAZINE) 10 MG tablet Take 1 tablet by mouth every 6 hours as needed for Nausea or Vomiting.   • predniSONE (DELTASONE) 5 MG tablet Take 1 tablet by mouth 2 times daily.   • albuterol 108 (90 BASE) MCG/ACT inhaler Inhale 2 puffs into the lungs every 4 hours as needed for Wheezing.     No current facility-administered medications for this visit.      ALLERGIES:  No Known Allergies    REVIEW OF SYSTEMS:   CONSTITUTIONAL: No fever or weakness. Fatigue.  ECO  EYES: No visual changes, pain or redness.   ENT: No acute hearing changes. No runny nose, sore throat or difficulty   swallowing.   MOUTH: No redness or soreness of the oral mucosa.   CARDIOVASCULAR: No chest pain or palpitations. No edema.   RESPIRATORY: No shortness of breath or cough.   GASTROINTESTINAL: No nausea or vomiting. No diarrhea or constipation. No   anorexia. No recent GI blood loss.   GENITOURINARY: New onset hematuria.  MUSCULOSKELETAL: Muscle strength is improving. Complaining of back pain.   INTEGUMENTARY: No skin rashes or irritations. No open sores.   NEUROLOGICAL: No headaches, blackouts or lightheadedness. No tingling. Numbness present in the left lower extremity. No acute memory loss.   PSYCHIATRIC: No acute anxiety or depression. No insomnia.   ENDOCRINE: No polydipsia, polyuria or polyphagia. No hot flashes.   HEMATOLOGIC: No spontaneous bruising. No signs of bleeding.   LYMPHATIC: No swollen glands.    PHYSICAL EXAMINATION:  VITAL SIGNS:   Visit Vitals   • /64 (Patient Position: Sitting)   • Pulse 107   • Resp 18   • Ht 6' 1\" (1.854 m)   • Wt 73.2 kg   • SpO2 97%   • BMI 21.29 kg/m2     CONSTITUTIONAL: No acute distress.    EYES: PERRL. Sclerae nonicteric.   ENT AND MOUTH: Otoscopic examination negative. Oral mucosa pink, moist and intact. Pharynx clear.   NECK: Trachea midline. Neck supple. No thyroid enlargement.    CARDIOVASCULAR: Regular rate and rhythm. No murmur or gallop.   RESPIRATORY: Nonlabored respirations. Symmetrical expansion. Breath sounds clear.   CHEST: Normal to palpation.    ABDOMEN: There is nothing suspicious on palpation. There is no tenderness, no rebound or guarding. Bowel sounds are normoactive.   GASTROINTESTINAL: Active bowel sounds x4 quadrants. No hernia.   GENITOURINARY: No bladder distension.   MUSCULOSKELETAL: Age-appropriate muscle strength and tone.  Lumbar back tenderness.   EXTREMITIES: Moves all extremities. No peripheral edema.   SKIN: Intact. No sores or rashes.  NEUROLOGICAL: Cranial nerves 2-12 grossly intact. Deep tendon reflex intact. Decreased sensation over left lower extremity. Strength intact.   PSYCHIATRIC: Oriented to person, place and time. Mood and affect normal. Judgment and insight normal.   LYMPHATIC: No palpable cervical, axillary or inguinal nodes.    LABS:    Lab Results   Component Value Date/Time    WBC 13.3 (H) 05/30/2017 01:03 PM    RBC 3.42 (L) 05/30/2017 01:03 PM    HGB 10.7 (L) 05/30/2017 01:03 PM    HCT 33.3 (L) 05/30/2017 01:03 PM     05/30/2017 01:03 PM     08/21/2013 07:19 AM    MCV 97.4 05/30/2017 01:03 PM    MCH 31.3 05/30/2017 01:03 PM    MCHC 32.1 05/30/2017 01:03 PM    RDWCV 15.6 (H) 05/30/2017 01:03 PM    SEG 90 05/23/2017 11:05 AM    TLYMPH 4 05/23/2017 11:05 AM    PMON 6 05/23/2017 11:05 AM    PEOS 0 05/23/2017 11:05 AM    PBASO 0 05/23/2017 11:05 AM    ANEUT 14.2 (H) 05/23/2017 11:05 AM    ALYMS 0.7 (L) 05/23/2017 11:05 AM    VIET 0.9 05/23/2017 11:05 AM    AEOS 0.0 (L) 05/23/2017 11:05 AM    ABASO 0.0 05/23/2017 11:05 AM     Lab Results   Component Value Date/Time    GLUCOSE 128 (H) 05/23/2017 11:05 AM    SODIUM 141 05/23/2017 11:05 AM    POTASSIUM 4.0 05/23/2017 11:05 AM    CHLORIDE 103 05/23/2017 11:05 AM    BUN 10 05/23/2017 11:05 AM    CREATININE 0.71 05/23/2017 11:05 AM    CALCIUM 9.2 05/23/2017 11:05 AM    ALBUMIN 3.4 (L)  05/23/2017 11:05 AM    AST 27 05/23/2017 11:05 AM    ALKPT 80 05/23/2017 11:05 AM    GPT 25 05/23/2017 11:05 AM    ANIONGAP 12 05/23/2017 11:05 AM    BCRAT 14 05/23/2017 11:05 AM    GLOB 3.7 05/23/2017 11:05 AM    AGR 0.9 (L) 05/23/2017 11:05 AM     End Exam   Date Time   Oct 21, 2016  1:30 PM   Reason For Exam   Prostate cancer, staging   PACS Images   Show images for NM Bone Scan   Study Result      WHOLE BODY BONE SCINTIGRAPHY     DATE OF SERVICE: 10/21/2016     INDICATION: Prostate cancer.     COMPARISON: MRI thoracic and lumbar spine 10/20/2016; CT chest, abdomen and  pelvis 10/20/2016.     RADIOPHARMACEUTICAL: 23.6 mCi Tc99m-MDP IV     PROCEDURE: Two to 4 hours after radionuclide administration, planar images  of the whole body in the anterior and posterior projections were obtained.        FINDINGS:   Radiotracer uptake throughout the axial skeleton, and to a lesser extent  the proximal appendicular skeleton (with at least the proximal femurs), is  diffusely heterogeneous, consistent with diffuse osseous metastatic  disease. There are regions of more focally increased radiotracer uptake  including the calvarium, sternum, bilateral ribs and scapulae, spine and  pelvis. Overall, the degree of osseous metastatic disease appears  underestimated on this exam, likely related to the lytic nature of these  lesions on CT.     Radiotracer uptake at the bilateral knees is likely degenerative.  Radiotracer uptake at the shoulders bilaterally, right greater than left  may be degenerative or related to metastatic disease.     There is decreased visualization of the kidneys, which may be related to  prominent osseous uptake of radiotracer.        IMPRESSION:     Diffuse osseous metastatic disease throughout the axial and proximal  appendicular skeleton. The burden of disease is underestimated by bone scan  secondary to the lytic nature of these lesions.        PACS Images   Show images for CT Chest Abdomen Pelvis   Study  Result   EXAM: CT CHEST ABDOMEN PELVIS W CONTRAST     CLINICAL HISTORY: INVOLUNTARY WEIGHT LOSS     COMPARISON: Chest CT from 8/15/2016.     TECHNIQUE: Following the administration of 100 mL of Isovue-300 IV contrast  material , CT images are obtained through the chest, abdomen, and  pelvis. Data were reformatted into coronal and sagittal series for review.     FINDINGS:  The thyroid gland and thoracic inlet structures appear unremarkable.  Proximal brachiocephalic arteries and thoracic aorta appear normal.  Moderate atherosclerotic calcification of the coronary arteries. Heart and  pericardium are unremarkable.     Esophagus appears unremarkable. No hiatal hernia.     No mediastinal, internal mammary chain, axillary, or hilar lymphadenopathy.     Suboptimal opacification of pulmonary arteries. No large/central pulmonary  embolus is appreciated.     Biapical cavitary lesions, with some peripheral nodularity, appear  unchanged compared to 8/15/2016. Paraseptal emphysematous blebs in both  upper lobes. Linear scarring/atelectasis dependently within both lower  lobes, unchanged. Paraseptal emphysematous bleb within the lingula.     Left paraspinal soft tissue mass extending from T4 through T6 measures 4.8  x 3.4 x 7.3 cm (axial series 4/image 43 and coronal series 801/image 30).  This is larger than on prior examination. There is associated epidural soft  tissue mass in the left posterior lateral aspect of the spinal canal  extending from T3 through T6, occupying approximately 75% of the spinal  canal diameter at this level. Extensive osteolytic metastases are seen from  T3 through T6 involving the vertebral bodies and the left costovertebral  junction of T4 as well as the left transverse process of T4 and T5.     In addition, there is a new right posterior lateral epidural mass at T7  measuring 1.0 x 0.7 x 1.9 cm (axial series 4/image 60 and coronal series  801/image 20). This occupies approximately 50% of the central  spinal canal  diameter.     Left posterior lateral 5th rib destructive osteolytic process with  associated subpleural soft tissue mass surrounding the area of rib  destruction (axial series 4/image 44) is again noted. A new, but subacute  appearing right posterior 10th rib fracture through an area of osteolysis  likely represents a subacute pathologic rib fracture. Osteolytic metastatic  lesions involve the left lateral 4th rib, left lateral 6th rib, left  lateral 7th rib, left lateral 8th rib, and left lateral 9th rib, as well,  fairly small in size, with several which appear new or larger compared to  prior examination. There is also osteolytic metastatic disease involving  the right 2nd, 5th, 6th, 7th, and 8th ribs, unchanged. Possible  nondisplaced subacute pathologic fracture involving the right lateral 6th  rib.     Osteolytic metastases of the scapula bilaterally. Estimated lesions within  the sternum appear larger than on prior examination. Extensive osteolytic  metastases throughout the remainder of the thoracic spine, less severe than  the aforementioned T4-T6 lesions, but increased in size compared 8/15/2016.  No evident thoracic vertebral body compression fracture.     Several ill-defined hypoattenuating hepatic lesions were not seen on prior  examination, measuring 2.3 cm in hepatic segment 8 (axial series 3/image  14) and 1.6 cm in hepatic segment 6 (axial series 3/image 29). Additional  more ill-defined hypoattenuating lesions are noted within hepatic segments  IVb and 5/8. No intrahepatic biliary ductal dilatation.     The gallbladder appears unremarkable. No extrahepatic biliary ductal  dilatation.     Pancreas, spleen, and adrenal glands appear unremarkable.     Urinary bladder is distended, measuring 16 x 16 x 11 cm (estimated urine  volume of 1400 mL).     Heterogeneous and somewhat avid enhancement of the majority of the prostate  gland, which is severely enlarged, measuring 8.1 x 7.8 x 5.1 cm  area in  presacral lymph node (perirectal) measures 2.2 x 2.6 x 1.8 cm (axial series  3/image 63 and sagittal series 900/image 75). Area of soft tissue  prominence in the precoccygeal region measuring 2. 42.0 x 4.2 cm (axial  series 3/image 69 and sagittal series 900/image 75) may reflect an  additional perirectal lymph node versus direct extracapsular extension of  prostate cancer.     Kidneys appear unremarkable. No hydronephrosis.     Stomach, small bowel, and colon appear unremarkable. Appendix is not  definitively visualized.     No free pelvic fluid or free intraperitoneal air.     No evident hernia.     Mild atherosclerotic calcification of a nonaneurysmal aortoiliac system.  Periaortic lymphadenopathy is noted at the level of the inferior mesenteric  artery origin, measuring 16 mm in short axis (axial series 3/image 36). No  other retroperitoneal or mesenteric lymphadenopathy is appreciated.     Innumerable osteolytic metastatic lesions throughout the lumbar spine and  pelvis. 17 mm osteolytic lesion within the right femoral neck (coronal  series 901/image 52).     IMPRESSION:  1. Findings consistent with prostate cancer with possible extracapsular  extension to the right posterior lateral aspect of the enlarged prostate  gland, lymph node involvement including a right perirectal lymph node and  left paraaortic lymph node, multiple presumed hepatic metastases, and  extensive osteolytic metastatic disease throughout the axial skeleton and  within the right femoral neck. Osteolytic lesions are most extensive from  T3 through T6. No associated thoracic or lumbar spine fracture.  2. Left paraspinal soft tissue mass from T3 through T6, with epidural  extension causing approximately 75% narrowing of the diameter of the  central spinal canal.  3. Additional epidural metastatic lesion at T7, causing approximately 50%  narrowing of the central spinal canal diameter.  4. Extensive osteolytic disease involving the left  posterior lateral 5th  rib, with multiple additional osteolytic rib lesions and probable subacute  pathologic fractures involving the right lateral 6th rib and right  posterior 10th rib.  5. No definite pulmonary metastases. Stable biapical cavitary lesions  remain compatible with underlying AZUCENA.     PSA, Total 1940.00 (H)         ASSESSMENT AND PLAN: This is a 65 year old male with metastatic prostate cancer. Scans and blood work reviewed.  S/p radiation to the back 10/2016  Continue Zometa.  Chemotherapy with Taxotere started. Treatment is palliative. Has received 5  out of 6 cycles.   Side effects were discussed, and patient understood possible complications.  MRI lumbar and thoracic spine reviewed. No new findings.Previous imaging shows extension of tumor into left L2 vertebrae.   History of PE, on Coumadin, managed by PCP (Lovenox was recommended by me)  Anemia secondary to chronic disease, relatively stable.  Bone scan prior to next visit.

## 2019-12-09 NOTE — TELEPHONE ENCOUNTER
"Hospital/TCU/ED for chronic condition Discharge Protocol    \"Hi, my name is Nyla Treviño RN, a registered nurse, and I am calling from [facility].    I am calling to follow up and see how things are going for you after your recent emergency visit/hospital stay.\"    Tell me how you are doing now that you are home?\" things are fine      Discharge Instructions    \"Let's review your discharge instructions.  What is/are the follow-up recommendations?  Pt. Response: follow up therapy  Verified she has crisis line phone numbers to use if needed    \"Has an appointment with your primary care provider been scheduled?\"   Yes. (confirm)    \"When you see the provider, I would recommend that you bring your medications with you.\"    Medications    \"Tell me what changed about your medicines when you discharged?\"    Changes to chronic meds?    0-1, added Prozac and hydroxyzine  PHQ-9 SCORE 12/9/2019   PHQ-9 Total Score 4     STAN-7 SCORE 12/9/2019   Total Score 7       \"What questions do you have about your medications?\"    None   Has not tried the hydroxyzine yet, warned to not drive until she knows how she will respond when taking  Discussed how antidepressants need chance to build up to become more effective     New diagnoses of heart failure, COPD, diabetes, or MI?    No       Post Discharge Medication Reconciliation Status: discharge medications reconciled, continue medications without change.    Was MTM referral placed (*Make sure to put transitions as reason for referral)?   No    Call Summary    \"What questions or concerns do you have about your recent visit and your follow-up care?\"     none    \"If you have questions or things don't continue to improve, we encourage you contact us through the main clinic number (give number).  Even if the clinic is not open, triage nurses are available 24/7 to help you.     We would like you to know that our clinic has extended hours (provide information).  We also have urgent care " "(provide details on closest location and hours/contact info)\"      \"Thank you for your time and take care!\"    Nyla Treviño RN, BS  Clinical Nurse Triage.           "

## 2019-12-10 ASSESSMENT — ANXIETY QUESTIONNAIRES: GAD7 TOTAL SCORE: 7

## 2019-12-19 ENCOUNTER — OFFICE VISIT (OUTPATIENT)
Dept: FAMILY MEDICINE | Facility: CLINIC | Age: 19
End: 2019-12-19
Payer: COMMERCIAL

## 2019-12-19 VITALS
SYSTOLIC BLOOD PRESSURE: 88 MMHG | BODY MASS INDEX: 20.53 KG/M2 | RESPIRATION RATE: 20 BRPM | DIASTOLIC BLOOD PRESSURE: 60 MMHG | WEIGHT: 135 LBS | TEMPERATURE: 98 F | HEART RATE: 80 BPM

## 2019-12-19 DIAGNOSIS — T14.91XA SUICIDAL BEHAVIOR WITH ATTEMPTED SELF-INJURY (H): Primary | ICD-10-CM

## 2019-12-19 DIAGNOSIS — F40.10 SOCIAL ANXIETY DISORDER: ICD-10-CM

## 2019-12-19 DIAGNOSIS — F33.1 MODERATE EPISODE OF RECURRENT MAJOR DEPRESSIVE DISORDER (H): ICD-10-CM

## 2019-12-19 DIAGNOSIS — B96.89 BACTERIAL VAGINOSIS: ICD-10-CM

## 2019-12-19 DIAGNOSIS — N76.0 BACTERIAL VAGINOSIS: ICD-10-CM

## 2019-12-19 PROCEDURE — 99495 TRANSJ CARE MGMT MOD F2F 14D: CPT | Mod: 25 | Performed by: PHYSICIAN ASSISTANT

## 2019-12-19 RX ORDER — METRONIDAZOLE 500 MG/1
500 TABLET ORAL 2 TIMES DAILY
Qty: 14 TABLET | Refills: 0 | Status: CANCELLED | OUTPATIENT
Start: 2019-12-19

## 2019-12-19 RX ORDER — METRONIDAZOLE 500 MG/1
500 TABLET ORAL 2 TIMES DAILY
Qty: 14 TABLET | Refills: 0 | Status: SHIPPED | OUTPATIENT
Start: 2019-12-19 | End: 2020-01-28

## 2019-12-19 ASSESSMENT — PATIENT HEALTH QUESTIONNAIRE - PHQ9
SUM OF ALL RESPONSES TO PHQ QUESTIONS 1-9: 7
10. IF YOU CHECKED OFF ANY PROBLEMS, HOW DIFFICULT HAVE THESE PROBLEMS MADE IT FOR YOU TO DO YOUR WORK, TAKE CARE OF THINGS AT HOME, OR GET ALONG WITH OTHER PEOPLE: SOMEWHAT DIFFICULT
SUM OF ALL RESPONSES TO PHQ QUESTIONS 1-9: 7

## 2019-12-19 ASSESSMENT — ANXIETY QUESTIONNAIRES
3. WORRYING TOO MUCH ABOUT DIFFERENT THINGS: SEVERAL DAYS
7. FEELING AFRAID AS IF SOMETHING AWFUL MIGHT HAPPEN: SEVERAL DAYS
2. NOT BEING ABLE TO STOP OR CONTROL WORRYING: SEVERAL DAYS
5. BEING SO RESTLESS THAT IT IS HARD TO SIT STILL: SEVERAL DAYS
GAD7 TOTAL SCORE: 8
4. TROUBLE RELAXING: SEVERAL DAYS
GAD7 TOTAL SCORE: 8
GAD7 TOTAL SCORE: 8
1. FEELING NERVOUS, ANXIOUS, OR ON EDGE: SEVERAL DAYS
7. FEELING AFRAID AS IF SOMETHING AWFUL MIGHT HAPPEN: SEVERAL DAYS
6. BECOMING EASILY ANNOYED OR IRRITABLE: MORE THAN HALF THE DAYS

## 2019-12-19 NOTE — LETTER
My Depression Action Plan  Name: Rama Reddy   Date of Birth 2000  Date: 12/19/2019    My doctor: No Ref-Primary, Physician   My clinic: [address], [address]  [facility] [address] [phone]          GREEN    ZONE   Good Control    What it looks like:     Things are going generally well. You have normal up s and down s. You may even feel depressed from time to time, but bad moods usually last less than a day.   What you need to do:  1. Continue to care for yourself (see self care plan)  2. Check your depression survival kit and update it as needed  3. Follow your physician s recommendations including any medication.  4. Do not stop taking medication unless you consult with your physician first.           YELLOW         ZONE Getting Worse    What it looks like:     Depression is starting to interfere with your life.     It may be hard to get out of bed; you may be starting to isolate yourself from others.    Symptoms of depression are starting to last most all day and this has happened for several days.     You may have suicidal thoughts but they are not constant.   What you need to do:     1. Call your care team, your response to treatment will improve if you keep your care team informed of your progress. Yellow periods are signs an adjustment may need to be made.     2. Continue your self-care, even if you have to fake it!    3. Talk to someone in your support network    4. Open up your depression survival kit           RED    ZONE Medical Alert - Get Help    What it looks like:     Depression is seriously interfering with your life.     You may experience these or other symptoms: You can t get out of bed most days, can t work or engage in other necessary activities, you have trouble taking care of basic hygiene, or basic responsibilities, thoughts of suicide or death that will not go away, self-injurious behavior.     What you need to  do:  1. Call your care team and request a same-day appointment. If they are not available (weekends or after hours) call your local crisis line, emergency room or 911.            Depression Self Care Plan / Survival Kit    Self-Care for Depression  Here s the deal. Your body and mind are really not as separate as most people think.  What you do and think affects how you feel and how you feel influences what you do and think. This means if you do things that people who feel good do, it will help you feel better.  Sometimes this is all it takes.  There is also a place for medication and therapy depending on how severe your depression is, so be sure to consult with your medical provider and/ or Behavioral Health Consultant if your symptoms are worsening or not improving.     In order to better manage my stress, I will:    Exercise  Get some form of exercise, every day. This will help reduce pain and release endorphins, the  feel good  chemicals in your brain. This is almost as good as taking antidepressants!  This is not the same as joining a gym and then never going! (they count on that by the way ) It can be as simple as just going for a walk or doing some gardening, anything that will get you moving.      Hygiene   Maintain good hygiene (Get out of bed in the morning, Make your bed, Brush your teeth, Take a shower, and Get dressed like you were going to work, even if you are unemployed).  If your clothes don't fit try to get ones that do.    Diet  I will strive to eat foods that are good for me, drink plenty of water, and avoid excessive sugar, caffeine, alcohol, and other mood-altering substances.  Some foods that are helpful in depression are: complex carbohydrates, B vitamins, flaxseed, fish or fish oil, fresh fruits and vegetables.    Psychotherapy  I agree to participate in Individual Therapy (if recommended).    Medication  If prescribed medications, I agree to take them.  Missing doses can result in serious  side effects.  I understand that drinking alcohol, or other illicit drug use, may cause potential side effects.  I will not stop my medication abruptly without first discussing it with my provider.    Staying Connected With Others  I will stay in touch with my friends, family members, and my primary care provider/team.    Use your imagination  Be creative.  We all have a creative side; it doesn t matter if it s oil painting, sand castles, or mud pies! This will also kick up the endorphins.    Witness Beauty  (AKA stop and smell the roses) Take a look outside, even in mid-winter. Notice colors, textures. Watch the squirrels and birds.     Service to others  Be of service to others.  There is always someone else in need.  By helping others we can  get out of ourselves  and remember the really important things.  This also provides opportunities for practicing all the other parts of the program.    Humor  Laugh and be silly!  Adjust your TV habits for less news and crime-drama and more comedy.    Control your stress  Try breathing deep, massage therapy, biofeedback, and meditation. Find time to relax each day.     My support system    Clinic Contact:  Phone number:    Contact 1:  Phone number:    Contact 2:  Phone number:    [facility]/:  Phone number:    Therapist:  Phone number:    Local crisis center:    Phone number:    Other community support:  Phone number:

## 2019-12-19 NOTE — PROGRESS NOTES
Subjective     Rama Reddy is a 19 year old female who presents to clinic today for the following health issues:    [facility]       Hospital Follow-up Visit:    Hospital/Nursing Home/IP Rehab Facility: [facility]  Date of Admission: 12/5/19  Date of Discharge: 12/9/19  Reason(s) for Admission: Suicidal Ideation            Problems taking medications regularly:  None       Medication changes since discharge: None       Problems adhering to non-medication therapy:  None    Summary of hospitalization:  [facility] discharge summary reviewed  Diagnostic Tests/Treatments reviewed.  Follow up needed: YES  Other Healthcare Providers Involved in Patient s Care:         MENTAL HEALTH- see below  Update since discharge: fluctuating course.     Post Discharge Medication Reconciliation: discharge medications reconciled and changed, per note/orders (see AVS).  Plan of care communicated with patient     Coding guidelines for this visit:  Type of Medical   Decision Making Face-to-Face Visit       within 7 Days of discharge Face-to-Face Visit        within 14 days of discharge   Moderate Complexity [id number]   High Complexity [id number]          Rama is her for follow up regarding a hospital stay for suicidal ideation.  She ingested 8 tabs of Flagyl and 13 tabs of Acyclovir and was admitted for Psych observation overnight 12/5-12/6/19.    Eventual Diagnoses were:  Adjustment Disorder with mixed anxiety and depressed mood  Social Anxiety Disorder  Cannabis Use Disorder, moderate  R/O Stimulant Use Disorder, cocaine type    It seems she has had some stress at home and at work as well that may have lead her to ingest the pills.  She then felt that she made a mistake and drove to the ED.  She was set up with a day treatment appointment (I'm not sure if she went to this) and also therapy appointment (she says today she is going to attend this).    She has started on Prozac- up to 10mg now.  She is not sure  if it is helpful.  Has not taken any of the the hydroxyzine.    She still has concerns today regarding the vaginal infection that is still untreated.  It was diagnosed at [facility] originally and she is still having symptoms.  Mentions her period is also one week late.  She is taking OCP's as prescribed.      Reviewed and updated as needed this visit by Provider         Review of Systems   ROS COMP: Constitutional, HEENT, cardiovascular, pulmonary, gi and gu systems are negative, except as otherwise noted.      Objective    BP (!) 88/60 (BP Location: Right arm, Patient Position: Sitting, Cuff Size: Adult Regular)   Pulse 80   Temp 98  F (36.7  C) (Oral)   Resp 20   Wt 61.2 kg (135 lb)   BMI 20.53 kg/m    Body mass index is 20.53 kg/m .  Physical Exam   GENERAL: healthy, alert and no distress  PSYCH: mentation appears normal, affect flat and anxious- she does not appear today to want to talk much about anything, very quiet, limited eye contact  No further exam 20 minutes total of this face to face visit was with discussion and counselling regarding hospital visit, depression, medications equal to >%50 of visit.              Assessment & Plan     1. Suicidal behavior with attempted self-injury (H)  She reports no more suicidal thinking or intent.  She is agreeable to increasing her dose of Prozac.  She has been on 10mg at this point.  She plans to meet with the therapist.  Agrees to see me again in one month for follow up again.  - FLUoxetine (PROZAC) 20 MG capsule; Take 1 capsule (20 mg) by mouth daily  Dispense: 30 capsule; Refill: 0    2. Moderate episode of recurrent major depressive disorder (H)  PHQ-9 SCORE 12/9/2019 12/19/2019   PHQ-9 Total Score MyChart - 7 (Mild depression)   PHQ-9 Total Score 4 7     - FLUoxetine (PROZAC) 20 MG capsule; Take 1 capsule (20 mg) by mouth daily  Dispense: 30 capsule; Refill: 0    3. Bacterial vaginosis  Prescribed again for her to complete.  - metroNIDAZOLE  (FLAGYL) 500 MG tablet; Take 1 tablet (500 mg) by mouth 2 times daily Take 1 tab by Mouth twice daily for 7 days  Dispense: 14 tablet; Refill: 0    4. Social anxiety disorder  STAN-7 SCORE 12/9/2019 12/19/2019   Total Score - 8 (mild anxiety)   Total Score 7 8         List of appts from discharge summary:     Attend your new patient  individual therapy appointment with HARINI Reddy on Wednesday, December 11, 2019 @ 1PM. Come at 12:30PM to fill out paperwork.  She is going to refer you to a psychiatric medication management provider in this agency.  [facility]  [address] [place] [address]  Ph: [phone]    [facility]  on Thursday, December 12, 2019 @ 1:30PM.  [facility]  [address] [place] [address]  [phone]      Return in about 3 weeks (around 1/9/2020) for Follow up.    Bg Sam PA-C  [facility]

## 2019-12-20 ASSESSMENT — PATIENT HEALTH QUESTIONNAIRE - PHQ9: SUM OF ALL RESPONSES TO PHQ QUESTIONS 1-9: 7

## 2019-12-20 ASSESSMENT — ANXIETY QUESTIONNAIRES: GAD7 TOTAL SCORE: 8

## 2020-01-01 PROBLEM — F33.1 MODERATE EPISODE OF RECURRENT MAJOR DEPRESSIVE DISORDER (H): Status: ACTIVE | Noted: 2020-01-01

## 2020-01-01 PROBLEM — F40.10 SOCIAL ANXIETY DISORDER: Status: ACTIVE | Noted: 2020-01-01

## 2020-01-28 ENCOUNTER — OFFICE VISIT (OUTPATIENT)
Dept: FAMILY MEDICINE | Facility: CLINIC | Age: 20
End: 2020-01-28
Payer: COMMERCIAL

## 2020-01-28 VITALS
HEART RATE: 88 BPM | TEMPERATURE: 98.1 F | BODY MASS INDEX: 18.96 KG/M2 | HEIGHT: 69 IN | RESPIRATION RATE: 16 BRPM | WEIGHT: 128 LBS | DIASTOLIC BLOOD PRESSURE: 62 MMHG | SYSTOLIC BLOOD PRESSURE: 92 MMHG

## 2020-01-28 DIAGNOSIS — T14.91XA SUICIDAL BEHAVIOR WITH ATTEMPTED SELF-INJURY (H): ICD-10-CM

## 2020-01-28 DIAGNOSIS — F33.1 MODERATE EPISODE OF RECURRENT MAJOR DEPRESSIVE DISORDER (H): ICD-10-CM

## 2020-01-28 PROCEDURE — 99213 OFFICE O/P EST LOW 20 MIN: CPT | Performed by: PHYSICIAN ASSISTANT

## 2020-01-28 RX ORDER — BUSPIRONE HYDROCHLORIDE 5 MG/1
5 TABLET ORAL 2 TIMES DAILY
Qty: 60 TABLET | Refills: 1 | Status: SHIPPED | OUTPATIENT
Start: 2020-01-28 | End: 2020-05-13

## 2020-01-28 RX ORDER — HYDROXYZINE HYDROCHLORIDE 25 MG/1
25 TABLET, FILM COATED ORAL 2 TIMES DAILY PRN
Qty: 30 TABLET | Refills: 0 | Status: CANCELLED | OUTPATIENT
Start: 2020-01-28

## 2020-01-28 ASSESSMENT — ANXIETY QUESTIONNAIRES
7. FEELING AFRAID AS IF SOMETHING AWFUL MIGHT HAPPEN: NOT AT ALL
GAD7 TOTAL SCORE: 8
7. FEELING AFRAID AS IF SOMETHING AWFUL MIGHT HAPPEN: NOT AT ALL
2. NOT BEING ABLE TO STOP OR CONTROL WORRYING: SEVERAL DAYS
6. BECOMING EASILY ANNOYED OR IRRITABLE: MORE THAN HALF THE DAYS
3. WORRYING TOO MUCH ABOUT DIFFERENT THINGS: MORE THAN HALF THE DAYS
GAD7 TOTAL SCORE: 8
1. FEELING NERVOUS, ANXIOUS, OR ON EDGE: SEVERAL DAYS
4. TROUBLE RELAXING: SEVERAL DAYS
GAD7 TOTAL SCORE: 8
5. BEING SO RESTLESS THAT IT IS HARD TO SIT STILL: SEVERAL DAYS

## 2020-01-28 ASSESSMENT — PATIENT HEALTH QUESTIONNAIRE - PHQ9
10. IF YOU CHECKED OFF ANY PROBLEMS, HOW DIFFICULT HAVE THESE PROBLEMS MADE IT FOR YOU TO DO YOUR WORK, TAKE CARE OF THINGS AT HOME, OR GET ALONG WITH OTHER PEOPLE: SOMEWHAT DIFFICULT
SUM OF ALL RESPONSES TO PHQ QUESTIONS 1-9: 7
SUM OF ALL RESPONSES TO PHQ QUESTIONS 1-9: 7

## 2020-01-28 ASSESSMENT — MIFFLIN-ST. JEOR: SCORE: 1411.01

## 2020-01-28 NOTE — PROGRESS NOTES
Subjective     Rama Reddy is a 19 year old female who presents to clinic today for the following health issues:    History of Present Illness        Mental Health Follow-up:  Patient presents to follow-up on Depression & Anxiety.Patient's depression since last visit has been:  Good  The patient is not having other symptoms associated with depression.  Patient's anxiety since last visit has been:  Good  The patient is not having other symptoms associated with anxiety.  Any significant life events: job concerns and financial concerns  Patient is feeling anxious or having panic attacks.  Patient has no concerns about alcohol or drug use.     Social History  Tobacco Use    Smoking status: Never Smoker    Smokeless tobacco: Never Used  Alcohol use: No  Drug use: No      Today's PHQ-9         PHQ-9 Total Score:     7   PHQ-9 Q9 Thoughts of better off dead/self-harm past 2 weeks :   Not at all   Thoughts of suicide or self harm:      Self-harm Plan:        Self-harm Action:          Safety concerns for self or others:             PHQ-9 SCORE 12/9/2019 12/19/2019 1/28/2020   PHQ-9 Total Score MyChart - 7 (Mild depression) 7 (Mild depression)   PHQ-9 Total Score 4 7 7       STAN-7 SCORE 12/9/2019 12/19/2019 1/28/2020   Total Score - 8 (mild anxiety) 8 (mild anxiety)   Total Score 7 8 8       Rama is here for follow up.  Today she reports to be doing well.  She is still living at home with her dad and working.  She is taking her medication daily.  She is not interested in participating in therapy at this time.  Anxiety is probably bothering her more right now than anything.    Reviewed and updated as needed this visit by Provider         Review of Systems   ROS COMP: Constitutional, HEENT, cardiovascular, pulmonary, gi and gu systems are negative, except as otherwise noted.      Objective    BP 92/62 (BP Location: Right arm, Patient Position: Sitting, Cuff Size: Adult Regular)   Pulse 88   Temp 98.1  F (36.7  C)  "(Oral)   Resp 16   Ht 1.746 m (5' 8.75\")   Wt 58.1 kg (128 lb)   BMI 19.04 kg/m    Body mass index is 19.04 kg/m .  Physical Exam   GENERAL: healthy, alert and no distress  PSYCH: mentation appears normal and affect flat    Diagnostic Test Results:  Labs reviewed in Epic        Assessment & Plan     1. Moderate episode of recurrent major depressive disorder (H)  She never seems to offer much information.  She will agree to increase to TWO tabs of Prozac today.  We can change Rx to 40mg if she feels this is helpful for her mood.    Will add Buspar today as well for anxiety.  Discussed mechanism of action of medication, proper dosing, potential side effects and appropriate follow up.    PHQ-9 SCORE 12/9/2019 12/19/2019 1/28/2020   PHQ-9 Total Score MyChart - 7 (Mild depression) 7 (Mild depression)   PHQ-9 Total Score 4 7 7     STAN-7 SCORE 12/9/2019 12/19/2019 1/28/2020   Total Score - 8 (mild anxiety) 8 (mild anxiety)   Total Score 7 8 8       - FLUoxetine (PROZAC) 20 MG capsule; Take 2 capsules (40 mg) by mouth daily  Dispense: 90 capsule; Refill: 0  - busPIRone (BUSPAR) 5 MG tablet; Take 1 tablet (5 mg) by mouth 2 times daily  Dispense: 60 tablet; Refill: 1    2. Suicidal behavior with attempted self-injury (H)    - FLUoxetine (PROZAC) 20 MG capsule; Take 2 capsules (40 mg) by mouth daily  Dispense: 90 capsule; Refill: 0       Return in about 2 months (around 3/28/2020) for Follow up, Med Check.    Bg Sam PA-C  [facility]    Answers for HPI/ROS submitted by the patient on 1/28/2020   If you checked off any problems, how difficult have these problems made it for you to do your work, take care of things at home, or get along with other people?: Somewhat difficult  PHQ9 TOTAL SCORE: 7  STAN 7 TOTAL SCORE: 8    "

## 2020-01-29 ASSESSMENT — ANXIETY QUESTIONNAIRES: GAD7 TOTAL SCORE: 8

## 2020-03-10 ENCOUNTER — MYC MEDICAL ADVICE (OUTPATIENT)
Dept: FAMILY MEDICINE | Facility: CLINIC | Age: 20
End: 2020-03-10

## 2020-03-10 NOTE — LETTER
[address], [address]  [facility] [id number] [phone]  March 17, 2020    Rama Reddy  [address]  [facility] [address]      Dear Rama,    I care about your health and have reviewed your health plan.  I have reviewed your medical conditions, medication list, and lab results and am making recommendations  based on this review, to better manage your health.    You are particularly in need of attention regarding:  -Cervical Cancer Screening and -Wellness (Physical) Visit    I am recommending that you:  -schedule a WELLNESS (Physical) APPOINTMENT with me.   I will check fasting labs the same day - nothing to eat except water and meds for 8-10 hours prior.      Here is a list of Health Maintenance topics that are due now or due soon:  Health Maintenance Due   Topic Date Due     PREVENTIVE CARE VISIT  2000     ANNUAL REVIEW OF HM ORDERS  2000     CHLAMYDIA SCREENING  2000     HIV SCREENING  03/10/2014     PAP  03/10/2020       Please call us at [phone] (or use [employer]) to address the above   recommendations.    Thank you for trusting [facility] and we appreciate the opportunity to serve you.  We look forward to supporting your healthcare needs in the future.    Healthy Regards,    Bg PEARCE

## 2020-03-10 NOTE — TELEPHONE ENCOUNTER
Panel Management Review      Patient has the following on her problem list:     Depression / Dysthymia review    Measure:  Needs PHQ-9 score of 4 or less during index window.  Administer PHQ-9 and if score is 5 or more, send encounter to provider for next steps.    5 - 7 month window range:     PHQ-9 SCORE 12/9/2019 12/19/2019 1/28/2020   PHQ-9 Total Score MyChart - 7 (Mild depression) 7 (Mild depression)   PHQ-9 Total Score 4 7 7       If PHQ-9 recheck is 5 or more, route to provider for next steps.    Patient is due for:  None      Composite cancer screening  Chart review shows that this patient is due/due soon for the following Pap Smear  Summary:    Patient is due/failing the following:   CHLAMYDIA, PAP and PHYSICAL    Action needed:   Patient needs office visit for physical, pap, and  Chlamydia screening .    Type of outreach:    Sent [employer] message.    Questions for provider review:    None                                                                                                                                    Dayanara Escalante MA         Chart routed to Care Team .

## 2020-05-11 DIAGNOSIS — F33.1 MODERATE EPISODE OF RECURRENT MAJOR DEPRESSIVE DISORDER (H): ICD-10-CM

## 2020-05-12 NOTE — TELEPHONE ENCOUNTER
Routing refill request to provider for review/approval because:  Abnormal phq9    Fernanda Tobar, RN on 5/12/2020 at 12:00 PM

## 2020-05-13 RX ORDER — BUSPIRONE HYDROCHLORIDE 5 MG/1
TABLET ORAL
Qty: 60 TABLET | Refills: 0 | Status: SHIPPED | OUTPATIENT
Start: 2020-05-13 | End: 2020-05-15

## 2020-05-13 NOTE — TELEPHONE ENCOUNTER
I am thinking based on her chart and the refills there that she is not taking this or the Prozac regularly.  I did fill this but please call to see if we can schedule a virtual appointment to discuss.      Thanks,  Bg

## 2020-05-14 ENCOUNTER — TELEPHONE (OUTPATIENT)
Dept: FAMILY MEDICINE | Facility: CLINIC | Age: 20
End: 2020-05-14

## 2020-05-14 NOTE — TELEPHONE ENCOUNTER
Name: Rama Reddy MRN: [id number]   Date: 5/14/2020 Status: Scheduled   Time: 11:00 AM Length: 30   Visit Type: VIDEO VISIT SHORT [4810] Copay: $0.00   Provider: Bg Sam PA-C Department:  FAMILY PRACTICE     [facility] 05/14/2020 needs to reschedule todays appointment provider not in clinic    Jyothi Elliott/

## 2020-05-15 ENCOUNTER — VIRTUAL VISIT (OUTPATIENT)
Dept: FAMILY MEDICINE | Facility: CLINIC | Age: 20
End: 2020-05-15
Payer: COMMERCIAL

## 2020-05-15 DIAGNOSIS — F33.1 MODERATE EPISODE OF RECURRENT MAJOR DEPRESSIVE DISORDER (H): Primary | ICD-10-CM

## 2020-05-15 DIAGNOSIS — F40.10 SOCIAL ANXIETY DISORDER: ICD-10-CM

## 2020-05-15 PROCEDURE — 96127 BRIEF EMOTIONAL/BEHAV ASSMT: CPT | Performed by: PHYSICIAN ASSISTANT

## 2020-05-15 PROCEDURE — 99214 OFFICE O/P EST MOD 30 MIN: CPT | Mod: GT | Performed by: PHYSICIAN ASSISTANT

## 2020-05-15 RX ORDER — FLUOXETINE 40 MG/1
40 CAPSULE ORAL DAILY
Qty: 90 CAPSULE | Refills: 0 | Status: SHIPPED | OUTPATIENT
Start: 2020-05-15

## 2020-05-15 RX ORDER — BUSPIRONE HYDROCHLORIDE 10 MG/1
10 TABLET ORAL 2 TIMES DAILY
Qty: 180 TABLET | Refills: 0 | Status: SHIPPED | OUTPATIENT
Start: 2020-05-15

## 2020-05-15 ASSESSMENT — ANXIETY QUESTIONNAIRES
2. NOT BEING ABLE TO STOP OR CONTROL WORRYING: SEVERAL DAYS
6. BECOMING EASILY ANNOYED OR IRRITABLE: MORE THAN HALF THE DAYS
3. WORRYING TOO MUCH ABOUT DIFFERENT THINGS: SEVERAL DAYS
IF YOU CHECKED OFF ANY PROBLEMS ON THIS QUESTIONNAIRE, HOW DIFFICULT HAVE THESE PROBLEMS MADE IT FOR YOU TO DO YOUR WORK, TAKE CARE OF THINGS AT HOME, OR GET ALONG WITH OTHER PEOPLE: SOMEWHAT DIFFICULT
5. BEING SO RESTLESS THAT IT IS HARD TO SIT STILL: NOT AT ALL
GAD7 TOTAL SCORE: 6
7. FEELING AFRAID AS IF SOMETHING AWFUL MIGHT HAPPEN: NOT AT ALL
1. FEELING NERVOUS, ANXIOUS, OR ON EDGE: SEVERAL DAYS

## 2020-05-15 ASSESSMENT — PATIENT HEALTH QUESTIONNAIRE - PHQ9
SUM OF ALL RESPONSES TO PHQ QUESTIONS 1-9: 11
5. POOR APPETITE OR OVEREATING: SEVERAL DAYS

## 2020-05-15 NOTE — PROGRESS NOTES
"Rama Reddy is a 20 year old female who is being evaluated via a billable video visit.      The patient has been notified of following:     \"This video visit will be conducted via a call between you and your physician/provider. We have found that certain health care needs can be provided without the need for an in-person physical exam.  This service lets us provide the care you need with a video conversation.  If a prescription is necessary we can send it directly to your pharmacy.  If lab work is needed we can place an order for that and you can then stop by our lab to have the test done at a later time.    Video visits are billed at different rates depending on your insurance coverage.  Please reach out to your insurance provider with any questions.    If during the course of the call the physician/provider feels a video visit is not appropriate, you will not be charged for this service.\"    Patient has given verbal consent for Video visit? Yes    How would you like to obtain your AVS? RadhaElliott    Patient would like the video invitation sent by: Text to cell phone: [phone]    Will anyone else be joining your video visit? No    Subjective     Rama Reddy is a 20 year old female who presents today via video visit for the following health issues:    HPI  Depression and Anxiety Follow-Up    How are you doing with your depression since your last visit? No change    How are you doing with your anxiety since your last visit?  No change    Are you having other symptoms that might be associated with depression or anxiety? No    Have you had a significant life event? No     Do you have any concerns with your use of alcohol or other drugs? No    Social History     Tobacco Use     Smoking status: Never Smoker     Smokeless tobacco: Never Used   Substance Use Topics     Alcohol use: No     Drug use: No     PHQ 12/19/2019 1/28/2020 5/15/2020   PHQ-9 Total Score 7 7 11   Q9: Thoughts of better off dead/self-harm past " "2 weeks Not at all Not at all Not at all       STAN-7 SCORE 12/19/2019 1/28/2020 5/15/2020   Total Score 8 (mild anxiety) 8 (mild anxiety) -   Total Score 8 8 6         How many servings of fruits and vegetables do you eat daily?  2-3    On average, how many sweetened beverages do you drink each day (Examples: soda, juice, sweet tea, etc.  Do NOT count diet or artificially sweetened beverages)?   1    How many days per week do you exercise enough to make your heart beat faster? Walking every day    How many minutes a day do you exercise enough to make your heart beat faster? 30 - 60    How many days per week do you miss taking your medication? 0      Rama is calling in via video today to recheck meds and depression.  She says she is feeling well (thought PHQ and STAN do not reflect).  She has been helping with younger sibs at home with recent COVID situation.  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  Says mood has not been affected by this.  She does mention that she has had to take TWO of the 5mg buspar to feel effect however.  Wondering about increasing that dose.      Video Start Time: 9:46 AM    Reviewed and updated as needed this visit by Provider         Review of Systems   Constitutional, HEENT, cardiovascular, pulmonary, gi and gu systems are negative, except as otherwise noted.      Objective    There were no vitals taken for this visit.  Estimated body mass index is 19.04 kg/m  as calculated from the following:    Height as of 1/28/20: 1.746 m (5' 8.75\").    Weight as of 1/28/20: 58.1 kg (128 lb).  Physical Exam     GENERAL: Healthy, alert and no distress  EYES: Eyes grossly normal to inspection.  No discharge or erythema, or obvious scleral/conjunctival abnormalities.  RESP: No audible wheeze, cough, or visible cyanosis.  No visible retractions or increased work of breathing.    SKIN: Visible skin clear. No significant rash, abnormal pigmentation or lesions.  NEURO: Cranial nerves grossly intact.  " Mentation and speech appropriate for age.  PSYCH: Mentation appears normal, affect normal/bright, judgement and insight intact, normal speech and appearance well-groomed.      Diagnostic Test Results:  none         Assessment & Plan     1. Moderate episode of recurrent major depressive disorder (H)  She seems to consistently score higher on PHQ than how she says she is feeling.  Will check in again in 3 months.  She wants to keep Prozac dosing the same -- denies suicidal thinking.  Increase buspar today.  - FLUoxetine (PROZAC) 40 MG capsule; Take 1 capsule (40 mg) by mouth daily  Dispense: 90 capsule; Refill: 0  - busPIRone (BUSPAR) 10 MG tablet; Take 1 tablet (10 mg) by mouth 2 times daily  Dispense: 180 tablet; Refill: 0    2. Social anxiety disorder  Increase dose to 10mg BID.  - busPIRone (BUSPAR) 10 MG tablet; Take 1 tablet (10 mg) by mouth 2 times daily  Dispense: 180 tablet; Refill: 0         Return in about 3 months (around 8/15/2020) for depression/anxiety med check.    Bg Sam PA-C  [facility] [employer]      Video-Visit Details    Type of service:  Video Visit    Video End Time:9:53 AM    Originating Location (pt. Location): Home    Distant Location (provider location):  [facility] [employer]     Platform used for Video Visit: Doximity    No follow-ups on file.       Bg Sam PA-C

## 2020-05-16 ASSESSMENT — ANXIETY QUESTIONNAIRES: GAD7 TOTAL SCORE: 6

## 2020-11-07 ENCOUNTER — HEALTH MAINTENANCE LETTER (OUTPATIENT)
Age: 20
End: 2020-11-07

## 2021-09-05 ENCOUNTER — HEALTH MAINTENANCE LETTER (OUTPATIENT)
Age: 21
End: 2021-09-05

## 2021-12-26 ENCOUNTER — HEALTH MAINTENANCE LETTER (OUTPATIENT)
Age: 21
End: 2021-12-26

## 2022-10-23 ENCOUNTER — HEALTH MAINTENANCE LETTER (OUTPATIENT)
Age: 22
End: 2022-10-23

## 2023-04-02 ENCOUNTER — HEALTH MAINTENANCE LETTER (OUTPATIENT)
Age: 23
End: 2023-04-02

## 2024-07-05 ENCOUNTER — HOSPITAL ENCOUNTER (EMERGENCY)
Facility: CLINIC | Age: 24
Discharge: HOME | End: 2024-07-05
Attending: EMERGENCY MEDICINE | Admitting: EMERGENCY MEDICINE
Payer: COMMERCIAL

## 2024-07-05 VITALS
TEMPERATURE: 98.6 F | WEIGHT: 150 LBS | BODY MASS INDEX: 22.22 KG/M2 | HEIGHT: 69 IN | OXYGEN SATURATION: 96 % | DIASTOLIC BLOOD PRESSURE: 59 MMHG | RESPIRATION RATE: 20 BRPM | SYSTOLIC BLOOD PRESSURE: 91 MMHG | HEART RATE: 70 BPM

## 2024-07-05 DIAGNOSIS — R45.851 SUICIDAL IDEATION: ICD-10-CM

## 2024-07-05 DIAGNOSIS — T50.902A OVERDOSE, INTENTIONAL SELF-HARM, INITIAL ENCOUNTER (H): ICD-10-CM

## 2024-07-05 PROBLEM — F41.1 GENERALIZED ANXIETY DISORDER: Status: ACTIVE | Noted: 2024-07-05

## 2024-07-05 LAB
ALBUMIN SERPL BCG-MCNC: 4.6 G/DL (ref 3.5–5.2)
ALP SERPL-CCNC: 66 U/L (ref 40–150)
ALT SERPL W P-5'-P-CCNC: 18 U/L (ref 0–50)
ANION GAP SERPL CALCULATED.3IONS-SCNC: 15 MMOL/L (ref 7–15)
APAP SERPL-MCNC: <5 UG/ML (ref 10–30)
AST SERPL W P-5'-P-CCNC: 30 U/L (ref 0–45)
ATRIAL RATE - MUSE: 82 BPM
BASOPHILS # BLD AUTO: 0 10E3/UL (ref 0–0.2)
BASOPHILS NFR BLD AUTO: 1 %
BILIRUB SERPL-MCNC: 0.4 MG/DL
BUN SERPL-MCNC: 8.9 MG/DL (ref 6–20)
CALCIUM SERPL-MCNC: 9.3 MG/DL (ref 8.6–10)
CHLORIDE SERPL-SCNC: 106 MMOL/L (ref 98–107)
CREAT SERPL-MCNC: 0.73 MG/DL (ref 0.51–0.95)
DEPRECATED HCO3 PLAS-SCNC: 20 MMOL/L (ref 22–29)
DIASTOLIC BLOOD PRESSURE - MUSE: NORMAL MMHG
EGFRCR SERPLBLD CKD-EPI 2021: >90 ML/MIN/1.73M2
EOSINOPHIL # BLD AUTO: 0.1 10E3/UL (ref 0–0.7)
EOSINOPHIL NFR BLD AUTO: 2 %
ERYTHROCYTE [DISTWIDTH] IN BLOOD BY AUTOMATED COUNT: 12 % (ref 10–15)
ETHANOL SERPL-MCNC: 0.16 G/DL
GLUCOSE SERPL-MCNC: 93 MG/DL (ref 70–99)
HCG SERPL QL: NEGATIVE
HCT VFR BLD AUTO: 36.7 % (ref 35–47)
HGB BLD-MCNC: 12.6 G/DL (ref 11.7–15.7)
IMM GRANULOCYTES # BLD: 0 10E3/UL
IMM GRANULOCYTES NFR BLD: 0 %
INR PPP: 0.9 (ref 0.85–1.15)
INTERPRETATION ECG - MUSE: NORMAL
LYMPHOCYTES # BLD AUTO: 1.6 10E3/UL (ref 0.8–5.3)
LYMPHOCYTES NFR BLD AUTO: 29 %
MCH RBC QN AUTO: 31.8 PG (ref 26.5–33)
MCHC RBC AUTO-ENTMCNC: 34.3 G/DL (ref 31.5–36.5)
MCV RBC AUTO: 93 FL (ref 78–100)
MONOCYTES # BLD AUTO: 0.4 10E3/UL (ref 0–1.3)
MONOCYTES NFR BLD AUTO: 8 %
NEUTROPHILS # BLD AUTO: 3.3 10E3/UL (ref 1.6–8.3)
NEUTROPHILS NFR BLD AUTO: 60 %
NRBC # BLD AUTO: 0 10E3/UL
NRBC BLD AUTO-RTO: 0 /100
P AXIS - MUSE: 0 DEGREES
PLATELET # BLD AUTO: 247 10E3/UL (ref 150–450)
POTASSIUM SERPL-SCNC: 4.2 MMOL/L (ref 3.4–5.3)
PR INTERVAL - MUSE: 106 MS
PROT SERPL-MCNC: 7.4 G/DL (ref 6.4–8.3)
QRS DURATION - MUSE: 86 MS
QT - MUSE: 378 MS
QTC - MUSE: 441 MS
R AXIS - MUSE: 85 DEGREES
RBC # BLD AUTO: 3.96 10E6/UL (ref 3.8–5.2)
SALICYLATES SERPL-MCNC: <0.3 MG/DL
SODIUM SERPL-SCNC: 141 MMOL/L (ref 135–145)
SYSTOLIC BLOOD PRESSURE - MUSE: NORMAL MMHG
T AXIS - MUSE: 43 DEGREES
VENTRICULAR RATE- MUSE: 82 BPM
WBC # BLD AUTO: 5.5 10E3/UL (ref 4–11)

## 2024-07-05 PROCEDURE — 99285 EMERGENCY DEPT VISIT HI MDM: CPT

## 2024-07-05 PROCEDURE — 82040 ASSAY OF SERUM ALBUMIN: CPT | Performed by: EMERGENCY MEDICINE

## 2024-07-05 PROCEDURE — 80179 DRUG ASSAY SALICYLATE: CPT | Performed by: EMERGENCY MEDICINE

## 2024-07-05 PROCEDURE — 93005 ELECTROCARDIOGRAM TRACING: CPT | Mod: RTG

## 2024-07-05 PROCEDURE — 85610 PROTHROMBIN TIME: CPT | Performed by: EMERGENCY MEDICINE

## 2024-07-05 PROCEDURE — 82077 ASSAY SPEC XCP UR&BREATH IA: CPT | Performed by: EMERGENCY MEDICINE

## 2024-07-05 PROCEDURE — 80143 DRUG ASSAY ACETAMINOPHEN: CPT | Performed by: EMERGENCY MEDICINE

## 2024-07-05 PROCEDURE — 85025 COMPLETE CBC W/AUTO DIFF WBC: CPT | Performed by: EMERGENCY MEDICINE

## 2024-07-05 PROCEDURE — 36415 COLL VENOUS BLD VENIPUNCTURE: CPT | Performed by: EMERGENCY MEDICINE

## 2024-07-05 PROCEDURE — 84703 CHORIONIC GONADOTROPIN ASSAY: CPT | Performed by: EMERGENCY MEDICINE

## 2024-07-05 ASSESSMENT — COLUMBIA-SUICIDE SEVERITY RATING SCALE - C-SSRS
2. HAVE YOU ACTUALLY HAD ANY THOUGHTS OF KILLING YOURSELF IN THE PAST MONTH?: NO
6. HAVE YOU EVER DONE ANYTHING, STARTED TO DO ANYTHING, OR PREPARED TO DO ANYTHING TO END YOUR LIFE?: YES
1. IN THE PAST MONTH, HAVE YOU WISHED YOU WERE DEAD OR WISHED YOU COULD GO TO SLEEP AND NOT WAKE UP?: NO

## 2024-07-05 ASSESSMENT — ACTIVITIES OF DAILY LIVING (ADL)
ADLS_ACUITY_SCORE: 35

## 2024-07-05 NOTE — ED PROVIDER NOTES
"  Emergency Department Note      History of Present Illness     Chief Complaint   Drug Overdose    HPI   Rama Reddy is a 24 year old female with a history of depression presents to the ED for drug overdose. The patient arrived via EMS after talking approximately 30 ibuprofen with SI. She also had been drinking alcohol, but denies any drug use. The patient denies any trigger for her drug overdose tonight, and that it was an impulsive action. She denies any recent stressors. She does not take any current medications and has no recent illnesses. The patient lives at home with her father, sister, brother-in-law, and niece and nephew. She states that it is a good environment with people she can talk to. She does not have a counselor or therapist.     Independent Historian   None    Review of External Notes   Reviewed admission for suicidal behavior and self injury from December 2019    Past Medical History     Medical History and Problem List   Depression     Medications   Buspirone  Fluoxetine  Ortho-cyclen     Physical Exam     Patient Vitals for the past 24 hrs:   BP Temp Pulse Resp SpO2 Height Weight   07/05/24 0426 105/66 -- 75 14 -- -- --   07/05/24 0158 -- 97.6  F (36.4  C) -- -- -- -- --   07/05/24 0155 103/71 -- 92 18 95 % 1.753 m (5' 9\") 68 kg (150 lb)     Physical Exam  General: Patient is alert, awake and interactive when I enter the room  Head: The scalp, face, and head appear normal  Eyes: Conjunctivae are normal  ENT: The nose is normal, Pinnae are normal, External acoustic canals are normal  Neck: Trachea midline  CV: Pulses are normal.   Resp: No respiratory distress   Musc: Normal muscular tone, moving all extremities.  Skin: No rash or lesions noted  Neuro:  Speech is normal and fluent. Face is symmetric.   Psych: Normal affect.  Appropriate interactions.    Diagnostics     Lab Results   Labs Ordered and Resulted from Time of ED Arrival to Time of ED Departure   COMPREHENSIVE METABOLIC PANEL - " Abnormal       Result Value    Sodium 141      Potassium 4.2      Carbon Dioxide (CO2) 20 (*)     Anion Gap 15      Urea Nitrogen 8.9      Creatinine 0.73      GFR Estimate >90      Calcium 9.3      Chloride 106      Glucose 93      Alkaline Phosphatase 66      AST 30      ALT 18      Protein Total 7.4      Albumin 4.6      Bilirubin Total 0.4     ETHYL ALCOHOL LEVEL - Abnormal    Alcohol ethyl 0.16 (*)    ACETAMINOPHEN LEVEL - Abnormal    Acetaminophen <5.0 (*)    INR - Normal    INR 0.90     SALICYLATE LEVEL - Normal    Salicylate <0.3     HCG QUALITATIVE PREGNANCY - Normal    hCG Serum Qualitative Negative     CBC WITH PLATELETS AND DIFFERENTIAL    WBC Count 5.5      RBC Count 3.96      Hemoglobin 12.6      Hematocrit 36.7      MCV 93      MCH 31.8      MCHC 34.3      RDW 12.0      Platelet Count 247      % Neutrophils 60      % Lymphocytes 29      % Monocytes 8      % Eosinophils 2      % Basophils 1      % Immature Granulocytes 0      NRBCs per 100 WBC 0      Absolute Neutrophils 3.3      Absolute Lymphocytes 1.6      Absolute Monocytes 0.4      Absolute Eosinophils 0.1      Absolute Basophils 0.0      Absolute Immature Granulocytes 0.0      Absolute NRBCs 0.0             EKG   ECG results from 07/05/24   EKG 12-lead, tracing only     Value    Systolic Blood Pressure     Diastolic Blood Pressure     Ventricular Rate 82    Atrial Rate 82    DE Interval 106    QRS Duration 86        QTc 441    P Axis 0    R AXIS 85    T Axis 43    Interpretation ECG      Sinus rhythm with short DE  Otherwise normal ECG  Interpreted by me at 0207.          ED Course          ED Course   ED Course as of 07/05/24 0536   Fri Jul 05, 2024 0147 I obtained the history and examined the patient as above.          Medical Decision Making / Diagnosis     ALLIE Reddy is a 24 year old female with past medical history of anxiety, depression and overdose who presents to the emergency department with suicidal ideations and  intentional overdose.  Patient was intoxicated this evening and made an impulsive decision to overdose on ibuprofen.  Unclear about approximately how much by EMS estimates approximately 30.  Unclear time.  On initial evaluation here patient is awake and alert.  She is somewhat guarded regarding her answers and does not appear to be forthcoming.  She was placed on a hold.  Will require mental health evaluation.  Overdose workup in the emergency department is largely reassuring.  Currently awaiting DEC evaluation.    Disposition   Currently awaiting DEC evaluation     Diagnosis     ICD-10-CM    1. Suicidal ideation  R45.851       2. Overdose, intentional self-harm, initial encounter (H)  T50.902A            Scribe Disclosure:  IMichelle, am serving as a scribe at 1:46 AM on 7/5/2024 to document services personally performed by Rhys Mustafa based on my observations and the provider's statements to me.        Rhys Mustafa MD  07/05/24 2836

## 2024-07-05 NOTE — ED PROVIDER NOTES
Patient signed out to me by Dr. Seymour.  Please see the initial note for further details.  Plan at time of signout was DEC evaluation this morning when clinically sober.  DEC is now evaluating her.  DEC is endorsing discharge home with no safety concerns at this time and have made follow-up appointments.  Please see their documentation for further details.  I met with the patient and she is not suicidal.  She has no concerns about going home and feels safe going home.  I welcomed her to return at any time if she feels unsafe or has thoughts of hurting herself.  Strict return precautions were given and she was in no distress at time of discharge.     Reji Wyatt MD  07/05/24 6116

## 2024-07-05 NOTE — ED NOTES
Patient changed into  scrubs belongings removed and 1 labeled bag placed into DEC office. Patient given a glass of water and resting comfortably in the room. Patient watched by a 1:1 sitter.

## 2024-07-05 NOTE — DISCHARGE INSTRUCTIONS
A therapy and medication management appointment has been scheduled for you:   Therapy: DBT, Virtual  Date: Thursday, 7/11/2024    Time: 3:00 pm - 4:00 pm  Provider: Sandra Kwon PhD  [facility]  Location: [facility], [address] [place] [address] [address], [place] [address]  Phone: [phone]    Patient Instruction   Clients will need to be sent information online that they complete prior to their appointment, we will also need a picture of the front and back of their insurance card prior to their first appointment so that we can run benefits for them to let them know ahead of schedule what, if any, costs they will be responsible for when attending therapy with us.    Medication Management: Telepsychiatry  Date: Wednesday, 7/10/2024    Time: 10:00 am - 11:00 am  Provider: Shawna DENNIS  CNP,PMHNP  Location: [address], [address], [place] [address]  Phone: [phone]    Patient Instructions  Before your appointment, you must speak with our Intake Department. Our intake team will attempt to contact you. If you do not hear from them within 24 hours, please call them at [phone] and tell them you are a [facility] referral. If you do not speak with our Intake Department and complete the necessary paperwork they send you, we cannot see you at your scheduled appointment time.    It is your responsibility to contact your insurance company directly to verify coverage, eligibility,  payment, and benefit information for any appointments or referrals listed above.  [facility] maintains an extensive network of licensed behavioral health providers to connect patients with  the services they need. We do not charge providers a fee to participate in our referral network. We  match patients with providers based on a patient's specific treatment needs, insurance coverage, and  location. Our first effort will be to refer you to a provider within your care system and will  utilize  providers outside your care system as needed
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
## 2024-07-05 NOTE — ED NOTES
RN ED Mental Health Handoff Note    LAUREN    Does patient require 1:1? Yes    Hold and rights been given and documented for patient: Yes    Is the patient in  scrubs? Yes    Has the patient been searched? Yes    Is the 15 minute observation tool up to date? Yes    Was patient issued a welcome folder? Yes    Room check completed this shift: Yes    PSS3 and [place] Assessment/Reassessment this shift:    C-SSRS ([place])      Date and Time Q1 Wished to be Dead (Past Month) Q2 Suicidal Thoughts (Past Month) Q3 Suicidal Thought Method Q4 Suicidal Intent without Specific Plan Q5 Suicide Intent with Specific Plan Q6 Suicide Behavior (Lifetime) If yes to Q6, within past 3 months? Level of Risk per Screen Level of Risk per Screen User   07/05/24 0153 0-->no 0-->no -- -- -- 1-->yes -- -- no risks indicated SA            Behavioral status of patient: Green    Code 21 called this shift? No    Use of restraints/seclusion this shift? No    Most recent vital signs:  Temp: 97.6  F (36.4  C)   BP: 106/69 Pulse: 87   Resp: 17 SpO2: 95 % O2 Device: None (Room air)      Medications:  Scheduled medication compliance? No    PRN Meds administered this shift? No    Medications - No data to display      ADLs    Meal Provided this shift? No    Hygiene items provided? No    ADLs completed? Yes    Date of last shower: unknown    Any significant events this shift? No    Any information that would be helpful in caring for this patient?  none    Family present/updated? No    Location of patient's belongings: DEC office    Critical Care Minutes:  Does the patient need critical care minutes documented? No

## 2024-07-06 ENCOUNTER — TELEPHONE (OUTPATIENT)
Dept: BEHAVIORAL HEALTH | Facility: CLINIC | Age: 24
End: 2024-07-06
Payer: COMMERCIAL

## 2024-08-17 ENCOUNTER — HEALTH MAINTENANCE LETTER (OUTPATIENT)
Age: 24
End: 2024-08-17